# Patient Record
Sex: FEMALE | Race: WHITE | Employment: FULL TIME | URBAN - METROPOLITAN AREA
[De-identification: names, ages, dates, MRNs, and addresses within clinical notes are randomized per-mention and may not be internally consistent; named-entity substitution may affect disease eponyms.]

---

## 2017-11-11 ENCOUNTER — APPOINTMENT (OUTPATIENT)
Dept: ULTRASOUND IMAGING | Age: 37
End: 2017-11-11
Attending: EMERGENCY MEDICINE
Payer: SUBSIDIZED

## 2017-11-11 ENCOUNTER — HOSPITAL ENCOUNTER (EMERGENCY)
Age: 37
Discharge: HOME | End: 2017-11-11
Attending: EMERGENCY MEDICINE
Payer: SUBSIDIZED

## 2017-11-11 VITALS
SYSTOLIC BLOOD PRESSURE: 148 MMHG | WEIGHT: 167 LBS | OXYGEN SATURATION: 100 % | HEART RATE: 84 BPM | DIASTOLIC BLOOD PRESSURE: 80 MMHG | HEIGHT: 64 IN | TEMPERATURE: 98.5 F | BODY MASS INDEX: 28.51 KG/M2 | RESPIRATION RATE: 18 BRPM

## 2017-11-11 DIAGNOSIS — K80.20 CALCULUS OF GALLBLADDER WITHOUT CHOLECYSTITIS WITHOUT OBSTRUCTION: ICD-10-CM

## 2017-11-11 DIAGNOSIS — R10.9 RIGHT FLANK PAIN: Primary | ICD-10-CM

## 2017-11-11 LAB
APPEARANCE UR: ABNORMAL
BACTERIA URNS QL MICRO: NEGATIVE /HPF
BILIRUB UR QL: NEGATIVE
COLOR UR: ABNORMAL
EPITH CASTS URNS QL MICRO: ABNORMAL /LPF
GLUCOSE UR STRIP.AUTO-MCNC: NEGATIVE MG/DL
HCG UR QL: NEGATIVE
HGB UR QL STRIP: ABNORMAL
KETONES UR QL STRIP.AUTO: NEGATIVE MG/DL
LEUKOCYTE ESTERASE UR QL STRIP.AUTO: NEGATIVE
NITRITE UR QL STRIP.AUTO: NEGATIVE
PH UR STRIP: 6 [PH] (ref 5–8)
PROT UR STRIP-MCNC: ABNORMAL MG/DL
RBC #/AREA URNS HPF: ABNORMAL /HPF (ref 0–5)
SP GR UR REFRACTOMETRY: 1.02 (ref 1–1.03)
UROBILINOGEN UR QL STRIP.AUTO: 1 EU/DL (ref 0.2–1)
WBC URNS QL MICRO: ABNORMAL /HPF (ref 0–4)
YEAST BUDDING URNS QL: PRESENT

## 2017-11-11 PROCEDURE — 81025 URINE PREGNANCY TEST: CPT

## 2017-11-11 PROCEDURE — 76770 US EXAM ABDO BACK WALL COMP: CPT

## 2017-11-11 PROCEDURE — 99283 EMERGENCY DEPT VISIT LOW MDM: CPT

## 2017-11-11 PROCEDURE — 81001 URINALYSIS AUTO W/SCOPE: CPT | Performed by: EMERGENCY MEDICINE

## 2017-11-11 PROCEDURE — 74011250637 HC RX REV CODE- 250/637: Performed by: EMERGENCY MEDICINE

## 2017-11-11 RX ORDER — NAPROXEN 250 MG/1
500 TABLET ORAL
Status: COMPLETED | OUTPATIENT
Start: 2017-11-11 | End: 2017-11-11

## 2017-11-11 RX ADMIN — NAPROXEN 500 MG: 250 TABLET ORAL at 19:27

## 2017-11-11 NOTE — ED TRIAGE NOTES
Patient has right flank pain, nausea, lightheadedness, and abdominal swelling. States for two weeks now. Also has been spotting for 18 days.

## 2017-11-11 NOTE — ED PROVIDER NOTES
HPI Comments: 44 y.o. female with past medical history significant for meningitis, high cholesterol, sleep apnea, and herpes who presents from home with chief complaint of flank pain. Pt reports her R flank has been swollen with 9/10 pain for 2 weeks accompanied by nausea, \"spotting\", and difficulty starting a stream when urinating. She also c/o R ankle swelling for several days. Pt states she has previously had similar sx, and was dx with a \"kidney infection\" at that time. She has been taking naproxen with no relief. In addition,  Pt notes she has been drinking lots of water and cranberry juice. She takes vitamins daily. Pt denies fever. There are no other acute medical concerns at this time. Old Chart Review: Pt was last here September 29, 2015 for gastroenteritis. She had a CT of abdomen/pelvis which was negative on September 22, 2013. No kidney stones were mentioned. Social hx: smokes cigarettes    Note written by Koko Verma, as dictated by Aly Manrique DO 7:00 PM    The history is provided by the patient. Past Medical History:   Diagnosis Date    High cholesterol     Infectious disease     Herpes    Meningitis     Sleep apnea        Past Surgical History:   Procedure Laterality Date    HX GYN      2 D&C procedures at age 22    HX ORTHOPAEDIC      right leg tissue removal         No family history on file. Social History     Social History    Marital status: SINGLE     Spouse name: N/A    Number of children: N/A    Years of education: N/A     Occupational History    Not on file. Social History Main Topics    Smoking status: Former Smoker    Smokeless tobacco: Never Used    Alcohol use No    Drug use: No    Sexual activity: Not on file     Other Topics Concern    Not on file     Social History Narrative         ALLERGIES: Aspirin and Pcn [penicillins]    Review of Systems   Constitutional: Negative for fever. Cardiovascular: Positive for leg swelling. Gastrointestinal: Positive for nausea. Genitourinary: Positive for difficulty urinating, flank pain and vaginal bleeding. All other systems reviewed and are negative. Vitals:    11/11/17 1840   BP: 137/68   Pulse: 81   Resp: 20   Temp: 98.5 °F (36.9 °C)   SpO2: 100%   Weight: 75.8 kg (167 lb)   Height: 5' 4\" (1.626 m)            Physical Exam      Constitutional: Pt is awake and alert. Pt appears well-developed and well-nourished. NAD. HENT:   Head: Normocephalic and atraumatic. Nose: Nose normal.   Mouth/Throat: Oropharynx is clear and moist. No oropharyngeal exudate. Eyes: Conjunctivae and extraocular motions are normal. Pupils are equal, round, and reactive to light. Right eye exhibits no discharge. Left eye exhibits no discharge. No scleral icterus. Neck: No tracheal deviation present. Supple neck. Cardiovascular: Normal rate, regular rhythm, normal heart sounds and intact distal pulses. Exam reveals no gallop and no friction rub. No murmur heard. Pulmonary/Chest: Effort normal and breath sounds normal.  Pt  has no wheezes. Pt  has no rales. Abdominal: Soft. Pt  exhibits no distension and no mass. No tenderness. Pt  has no rebound and no guarding. Musculoskeletal:  Pt  exhibits and no tenderness. No CVA tenderness. Can't detect any swelling of R flank. Ext: Normal ROM in all four extremities; not tender to palpation; distal pulses are normal. R ankle minimally larger than L ankle. No redness. Neurological:  Pt is alert. nonfocal neuro exam.  Skin: Skin is warm and dry. Pt  is not diaphoretic. No rash. Psychiatric:  Pt  has a normal mood and affect.  Behavior is normal.   Note written by Koko Bruno, as dictated by Chucky Rueda DO 7:00 PM    [facility]  ED Course       Procedures           Labs Reviewed   URINALYSIS W/ RFLX MICROSCOPIC - Abnormal; Notable for the following:        Result Value    Appearance CLOUDY (*)     Protein TRACE (*)     Blood LARGE (*)     All other components within normal limits   HCG URINE, QL. - POC   POC URINE PREGNANCY TEST       Pt has been spotting - this could be source of the above results - pt would like imaging. Will US. Last CT 4 yrs ago had no stones mentioned. otc pain meds  Doubt this is biliary colic. No abd pain and not related to food.

## 2017-11-12 NOTE — DISCHARGE INSTRUCTIONS
Biliary Colic: Care Instructions  Your Care Instructions    Biliary (say \"BILL-ee-air-ee\") colic is belly pain caused by gallbladder problems. It is usually caused by a gallstone moving through or blocking the common bile duct or cystic duct. Gallstones are stones that form in the gallbladder. They are made of cholesterol and other substances. The gallbladder is a small sac located just under the liver. It stores bile released by the liver. Bile helps you digest fats. Gallstones also can form in the common bile duct or cystic duct. These ducts carry bile from the gallbladder and the liver to the small intestine. Gallstones may be as small as a grain of sand or as large as a golf ball. Gallstones that cause severe symptoms usually are treated with surgery to remove the gallbladder. If the first attack of biliary colic is mild, it is often safe to wait until you have had another attack before you think about having surgery. The doctor has checked you carefully, but problems can develop later. If you notice any problems or new symptoms, get medical treatment right away. Follow-up care is a key part of your treatment and safety. Be sure to make and go to all appointments, and call your doctor if you are having problems. It's also a good idea to know your test results and keep a list of the medicines you take. How can you care for yourself at home? · Take pain medicines exactly as directed. ¨ If the doctor gave you a prescription medicine for pain, take it as prescribed. ¨ If you are not taking a prescription pain medicine, ask your doctor if you can take an over-the-counter medicine. Read and follow all instructions on the label. · Avoid foods that cause symptoms, especially fatty foods. These can cause biliary colic. · You may need more tests to look at your gallbladder. When should you call for help? Call your doctor now or seek immediate medical care if:  ? · You have a fever.    ? · You have new belly pain, or your pain gets worse. ? · There is a new or increasing yellow tint to your skin or the whites of your eyes. ? · Your urine is dark yellow-brown, or your stools are light-colored or white. ? · You cannot keep down fluids. ? Watch closely for changes in your health, and be sure to contact your doctor if:  ? · You do not get better as expected. ? · You are not getting better after 1 day (24 hours). Where can you learn more? Go to http://andreea-fernando.info/. Enter W100 in the search box to learn more about \"Biliary Colic: Care Instructions. \"  Current as of: May 12, 2017  Content Version: 11.4  © 7708-5467 [employer]. Care instructions adapted under license by [employer]jing (which disclaims liability or warranty for this information). If you have questions about a medical condition or this instruction, always ask your healthcare professional. [place] [address] any warranty or liability for your use of this information. Low Back Pain: Exercises  Your Care Instructions  Here are some examples of typical rehabilitation exercises for your condition. Start each exercise slowly. Ease off the exercise if you start to have pain. Your doctor or physical therapist will tell you when you can start these exercises and which ones will work best for you. How to do the exercises  Press-up    1. Lie on your stomach, supporting your body with your forearms. 2. Press your elbows down into the floor to raise your upper back. As you do this, relax your stomach muscles and allow your back to arch without using your back muscles. As your press up, do not let your hips or pelvis come off the floor. 3. Hold for 15 to 30 seconds, then relax. 4. Repeat 2 to 4 times. Alternate arm and leg (bird dog) exercise    Do this exercise slowly. Try to keep your body straight at all times, and do not let one hip drop lower than the other.   1. Start on the floor, on your hands and knees. 2. Tighten your belly muscles. 3. Raise one leg off the floor, and hold it straight out behind you. Be careful not to let your hip drop down, because that will twist your trunk. 4. Hold for about 6 seconds, then lower your leg and switch to the other leg. 5. Repeat 8 to 12 times on each leg. 6. Over time, work up to holding for 10 to 30 seconds each time. 7. If you feel stable and secure with your leg raised, try raising the opposite arm straight out in front of you at the same time. Knee-to-chest exercise    1. Lie on your back with your knees bent and your feet flat on the floor. 2. Bring one knee to your chest, keeping the other foot flat on the floor (or keeping the other leg straight, whichever feels better on your lower back). 3. Keep your lower back pressed to the floor. Hold for at least 15 to 30 seconds. 4. Relax, and lower the knee to the starting position. 5. Repeat with the other leg. Repeat 2 to 4 times with each leg. 6. To get more stretch, put your other leg flat on the floor while pulling your knee to your chest.  Curl-ups    1. Lie on the floor on your back with your knees bent at a 90-degree angle. Your feet should be flat on the floor, about 12 inches from your buttocks. 2. Cross your arms over your chest. If this bothers your neck, try putting your hands behind your neck (not your head), with your elbows spread apart. 3. Slowly tighten your belly muscles and raise your shoulder blades off the floor. 4. Keep your head in line with your body, and do not press your chin to your chest.  5. Hold this position for 1 or 2 seconds, then slowly lower yourself back down to the floor. 6. Repeat 8 to 12 times. Pelvic tilt exercise    1. Lie on your back with your knees bent. 2. \"Brace\" your stomach. This means to tighten your muscles by pulling in and imagining your belly button moving toward your spine.  You should feel like your back is pressing to the floor and your hips and pelvis are rocking back. 3. Hold for about 6 seconds while you breathe smoothly. 4. Repeat 8 to 12 times. Heel dig bridging    1. Lie on your back with both knees bent and your ankles bent so that only your heels are digging into the floor. Your knees should be bent about 90 degrees. 2. Then push your heels into the floor, squeeze your buttocks, and lift your hips off the floor until your shoulders, hips, and knees are all in a straight line. 3. Hold for about 6 seconds as you continue to breathe normally, and then slowly lower your hips back down to the floor and rest for up to 10 seconds. 4. Do 8 to 12 repetitions. Hamstring stretch in doorway    1. Lie on your back in a doorway, with one leg through the open door. 2. Slide your leg up the wall to straighten your knee. You should feel a gentle stretch down the back of your leg. 3. Hold the stretch for at least 15 to 30 seconds. Do not arch your back, point your toes, or bend either knee. Keep one heel touching the floor and the other heel touching the wall. 4. Repeat with your other leg. 5. Do 2 to 4 times for each leg. Hip flexor stretch    1. Kneel on the floor with one knee bent and one leg behind you. Place your forward knee over your foot. Keep your other knee touching the floor. 2. Slowly push your hips forward until you feel a stretch in the upper thigh of your rear leg. 3. Hold the stretch for at least 15 to 30 seconds. Repeat with your other leg. 4. Do 2 to 4 times on each side. Wall sit    1. Stand with your back 10 to 12 inches away from a wall. 2. Lean into the wall until your back is flat against it. 3. Slowly slide down until your knees are slightly bent, pressing your lower back into the wall. 4. Hold for about 6 seconds, then slide back up the wall. 5. Repeat 8 to 12 times. Follow-up care is a key part of your treatment and safety.  Be sure to make and go to all appointments, and call your doctor if you are having problems. It's also a good idea to know your test results and keep a list of the medicines you take. Where can you learn more? Go to http://andreea-fernando.info/. Enter O330 in the search box to learn more about \"Low Back Pain: Exercises. \"  Current as of: March 21, 2017  Content Version: 11.4  © 5547-0874 [employer]unch[employer]. Care instructions adapted under license by [employer] (which disclaims liability or warranty for this information). If you have questions about a medical condition or this instruction, always ask your healthcare professional. [place] [address] any warranty or liability for your use of this information. Back Pain: Care Instructions  Your Care Instructions    Back pain has many possible causes. It is often related to problems with muscles and ligaments of the back. It may also be related to problems with the nerves, discs, or bones of the back. Moving, lifting, standing, sitting, or sleeping in an awkward way can strain the back. Sometimes you don't notice the injury until later. Arthritis is another common cause of back pain. Although it may hurt a lot, back pain usually improves on its own within several weeks. Most people recover in 12 weeks or less. Using good home treatment and being careful not to stress your back can help you feel better sooner. Follow-up care is a key part of your treatment and safety. Be sure to make and go to all appointments, and call your doctor if you are having problems. It's also a good idea to know your test results and keep a list of the medicines you take. How can you care for yourself at home? · Sit or lie in positions that are most comfortable and reduce your pain. Try one of these positions when you lie down:  ¨ Lie on your back with your knees bent and supported by large pillows. ¨ Lie on the floor with your legs on the seat of a sofa or chair.   Stuart Maidens on your side with your knees and hips bent and a pillow between your legs. ¨ Lie on your stomach if it does not make pain worse. · Do not sit up in bed, and avoid soft couches and twisted positions. Bed rest can help relieve pain at first, but it delays healing. Avoid bed rest after the first day of back pain. · Change positions every 30 minutes. If you must sit for long periods of time, take breaks from sitting. Get up and walk around, or lie in a comfortable position. · Try using a heating pad on a low or medium setting for 15 to 20 minutes every 2 or 3 hours. Try a warm shower in place of one session with the heating pad. · You can also try an ice pack for 10 to 15 minutes every 2 to 3 hours. Put a thin cloth between the ice pack and your skin. · Take pain medicines exactly as directed. ¨ If the doctor gave you a prescription medicine for pain, take it as prescribed. ¨ If you are not taking a prescription pain medicine, ask your doctor if you can take an over-the-counter medicine. · Take short walks several times a day. You can start with 5 to 10 minutes, 3 or 4 times a day, and work up to longer walks. Walk on level surfaces and avoid hills and stairs until your back is better. · Return to work and other activities as soon as you can. Continued rest without activity is usually not good for your back. · To prevent future back pain, do exercises to stretch and strengthen your back and stomach. Learn how to use good posture, safe lifting techniques, and proper body mechanics. When should you call for help? Call your doctor now or seek immediate medical care if:  ? · You have new or worsening numbness in your legs. ? · You have new or worsening weakness in your legs. (This could make it hard to stand up.)   ? · You lose control of your bladder or bowels. ? Watch closely for changes in your health, and be sure to contact your doctor if:  ? · Your pain gets worse. ? · You are not getting better after 2 weeks.    Where can you learn more?  Go to http://andreea-fernando.info/. Enter J145 in the search box to learn more about \"Back Pain: Care Instructions. \"  Current as of: March 21, 2017  Content Version: 11.4  © 8167-2164 Healthwise[employer]. Care instructions adapted under license by Vivisimo (which disclaims liability or warranty for this information). If you have questions about a medical condition or this instruction, always ask your healthcare professional. [address] any warranty or liability for your use of this information. [facility] Departments     For adult and child immunizations, family planning, TB screening, STD testing and women's health services. [facility]: [place] [phone]      [facility] 25   657 [facility]   [address]   170 [facility]: [address] [phone]      [facility]          [address] [place] [address]     For primary care services, woman and child wellness, and some clinics providing specialty care. [facility] -- 1011 [facility]. [address] [place] [address] [phone]   [address] [facility] 200 [facility] 3614 [facility] [phone]   339 [facility] [address] [place] [address] [phone]   [address] [address] [place] [address] [address] [place] [address]  [phone]   [address] [place] [phone]   [facility] 81 [facility] [phone]   Stefan Marquez [facility] 1051 [facility] [phone]   [facility]: [facility] 700 [address], ext [address] [facility], [address] [phone]     [address] [phone]   [facility] [place] 5850 [facility]  [phone]   [employer]  [address], [address] ([email or web address]/about/[email or web address]) [phone]         Sexual Health/Woman Wellness Clinics    For STD/HIV testing and treatment, pregnancy testing and services, men's health, birth control services, LGBT services, and hepatitis/HPV vaccine services. [employer] for [place] [employer] 201 [address]. [facility] 75 [facility] [address] [facility] 1579 600 [address]AMEL[address] Parker [phone]   [facility] [address], 5th floor [phone]   Pregnancy [address] [facility] [address]  [place] [phone] 6847 [address] [facility] 94 [facility]   [phone]   [place]   [phone]   Women, Infant and Children's Services: [address] [phone]       600 [facility]   [phone]   [facility]   [phone]   [facility]     [phone]   [address] Crisis   1212 [facility] [phone]     Local Primary [facility] Physicians  64 [facility] [phone]  Kristopher Kite, MD Merlin Ehlers, MD Greta Maroon, MD [facility] [phone]  Jodi Rios, MD Jorge Veliz MD Fredrica Staple, MD [address] [place] [address] [phone]  Ranell Antonio[place] [address] [facility] [phone]  Mayco Montoya, MD Jordan Sommers MD Edmundo Porteous, MD   [facility] [phone]  MD Sneha CHAMORRO MD Malena Sellers, NP [address] [facility] [address] [phone]  [facility], MD Kela Oppenheim, MD Cinthia Cruise, MD Lenita Rough, MD Marvene Pin, MD Jerald Pepper, MD Randon Porteous, MD   [place] [address] Chencho Thornton MD [facility] [phone]  MD Floresita Zarco Jacqueline Gee, HERSON Renteria, MD Daisy Judge, MD Kel Madrid, MD Sara Carpenter, MD Isi Restrepo, MD   7973 [facility] [phone]  Patricia Webb, MD Eva Long, FNP  Ana Wong, NP  Eric Youngblood, MD Trudi Chandler, MD Rob Scanlon, MD Jacki Figueroa, MD [facility] [phone]  Jordan Warner, MD Quinton Koyanagi, MD Queen Chaparro, MD Carmen Leija, MD Corby Juares, MD   [facility] [phone]  Lizette Huston, MD Gonsalo Bruce, MD [place] [phone]  MD Kevin Brenner, MD Luetta Aase, MD   [facility] [phone]  Damien Blair, MD Mali Carter, MD Kole Ramos, MD Alexis Lancaster, MD Alicia Zuniga, MD Mykel Harris, HERSON [address] MD 1619 [facility]   [phone]  Paula Mackey, MD Mercedes Strickland, MD Bernice Burroughs MD   2102 [facility] [phone]  [facility] 150, MD Lexi Jackson, [facility]  Radha Fortune, PA-C  Radha Fortune, FNP  Margi Mackey, PA-C  Catarino Gutierrez, MD Anne-Marie Mcwilliams, HERSON Ernst,  Miscellaneous:  Homero Lowry [phone]

## 2018-04-11 ENCOUNTER — APPOINTMENT (OUTPATIENT)
Dept: ULTRASOUND IMAGING | Age: 38
End: 2018-04-11
Attending: EMERGENCY MEDICINE
Payer: SUBSIDIZED

## 2018-04-11 ENCOUNTER — HOSPITAL ENCOUNTER (EMERGENCY)
Age: 38
Discharge: HOME | End: 2018-04-11
Attending: EMERGENCY MEDICINE | Admitting: EMERGENCY MEDICINE
Payer: SUBSIDIZED

## 2018-04-11 VITALS
OXYGEN SATURATION: 97 % | RESPIRATION RATE: 18 BRPM | WEIGHT: 185 LBS | TEMPERATURE: 98.2 F | BODY MASS INDEX: 31.58 KG/M2 | HEIGHT: 64 IN | DIASTOLIC BLOOD PRESSURE: 69 MMHG | SYSTOLIC BLOOD PRESSURE: 122 MMHG | HEART RATE: 84 BPM

## 2018-04-11 DIAGNOSIS — O23.41 UTI IN PREGNANCY, ANTEPARTUM, FIRST TRIMESTER: ICD-10-CM

## 2018-04-11 DIAGNOSIS — R10.32 ABDOMINAL PAIN, LLQ (LEFT LOWER QUADRANT): Primary | ICD-10-CM

## 2018-04-11 LAB
AMORPH CRY URNS QL MICRO: ABNORMAL
APPEARANCE UR: ABNORMAL
BACTERIA URNS QL MICRO: ABNORMAL /HPF
BILIRUB UR QL: NEGATIVE
CAOX CRY URNS QL MICRO: ABNORMAL
COLOR UR: ABNORMAL
EPITH CASTS URNS QL MICRO: ABNORMAL /LPF
GLUCOSE UR STRIP.AUTO-MCNC: NEGATIVE MG/DL
HCG UR QL: POSITIVE
HGB UR QL STRIP: ABNORMAL
KETONES UR QL STRIP.AUTO: NEGATIVE MG/DL
LEUKOCYTE ESTERASE UR QL STRIP.AUTO: ABNORMAL
NITRITE UR QL STRIP.AUTO: NEGATIVE
PH UR STRIP: 6 [PH] (ref 5–8)
PROT UR STRIP-MCNC: NEGATIVE MG/DL
RBC #/AREA URNS HPF: ABNORMAL /HPF (ref 0–5)
SP GR UR REFRACTOMETRY: 1.02 (ref 1–1.03)
UROBILINOGEN UR QL STRIP.AUTO: 0.2 EU/DL (ref 0.2–1)
WBC URNS QL MICRO: ABNORMAL /HPF (ref 0–4)

## 2018-04-11 PROCEDURE — 74011250637 HC RX REV CODE- 250/637: Performed by: EMERGENCY MEDICINE

## 2018-04-11 PROCEDURE — 81025 URINE PREGNANCY TEST: CPT

## 2018-04-11 PROCEDURE — 81001 URINALYSIS AUTO W/SCOPE: CPT | Performed by: EMERGENCY MEDICINE

## 2018-04-11 PROCEDURE — 87086 URINE CULTURE/COLONY COUNT: CPT | Performed by: EMERGENCY MEDICINE

## 2018-04-11 PROCEDURE — 76801 OB US < 14 WKS SINGLE FETUS: CPT

## 2018-04-11 PROCEDURE — 76817 TRANSVAGINAL US OBSTETRIC: CPT

## 2018-04-11 PROCEDURE — 99284 EMERGENCY DEPT VISIT MOD MDM: CPT

## 2018-04-11 RX ORDER — CEPHALEXIN 500 MG/1
500 CAPSULE ORAL 3 TIMES DAILY
Qty: 15 CAP | Refills: 0 | Status: SHIPPED | OUTPATIENT
Start: 2018-04-12 | End: 2018-04-17

## 2018-04-11 RX ORDER — CEPHALEXIN 250 MG/1
500 CAPSULE ORAL
Status: COMPLETED | OUTPATIENT
Start: 2018-04-11 | End: 2018-04-11

## 2018-04-11 RX ADMIN — CEPHALEXIN 500 MG: 250 CAPSULE ORAL at 22:32

## 2018-04-12 NOTE — ED TRIAGE NOTES
Patient was tested a week ago and was + for pregnancy, and has been having left lower abd pain. Has been feeling \"sick\". Thinks is about 5-6 weeks pregnant. Denies having any bleeding or discharge.

## 2018-04-12 NOTE — DISCHARGE INSTRUCTIONS
Belly Pain in Pregnancy: Care Instructions  Your Care Instructions    When you're pregnant, any belly pain can be a worry. You may not want to call your doctor about every pain you have. But you don't want to miss something that is dangerous for you or your baby. Even if it feels familiar, belly pain can mean something new when you're pregnant. It's important to know when to call your doctor. It will also help to know how to care for yourself at home when your pain is not caused by anything harmful. · When belly pain is more severe or constant, see a doctor right away. · If you're sure your belly pain is a sign of labor, call your doctor. · When belly pain is brief, it's usually a normal part of pregnancy. It might be related to changes in the growing uterus. Or it could be the stretching of ligaments called round ligaments. These ligaments help support the uterus. Round ligament pain can be on either side of your belly. It can also be felt in your hips or groin. Follow-up care is a key part of your treatment and safety. Be sure to make and go to all appointments, and call your doctor if you are having problems. It's also a good idea to know your test results and keep a list of the medicines you take. How can you tell if belly pain is a sign of labor? When belly pain is caused by labor, it can feel like mild or menstrual-like cramps in your lower belly. These cramps are probably contractions. They can happen in your second or third trimester. You may also have:  · A steady, dull ache in your lower back, pelvis, or thighs. · A feeling of pressure in your pelvis or lower belly. · Changes in your vaginal discharge or a sudden release of fluid from the vagina. If you think you are in labor, call your doctor. How can you care for yourself at home? When belly pain is mild and is not a symptom of labor:  · Rest until you feel better. · Take a warm bath.   · Think about what you drink and eat:  ¨ Drink plenty of fluids. Choose water and other caffeine-free clear liquids until you feel better. ¨ Try eating small, frequent meals. If your stomach is upset, try bland, low-fat foods like plain rice, broiled chicken, toast, and yogurt. · Think about how you move if you are having brief pains from stretching of the round ligaments. ¨ Try gentle stretching. ¨ Move a little more slowly when turning in bed or getting up from a chair, so those ligaments don't stretch quickly. ¨ Lean forward a bit if you think you are going to cough or sneeze. When should you call for help? Call 911 anytime you think you may need emergency care. For example, call if:  ? · You have sudden, severe pain in your belly. ? · You have severe vaginal bleeding. ?Call your doctor now or seek immediate medical care if:  ? · You have new or worse belly pain or cramping. ? · You have any vaginal bleeding. ? · You have a fever. ? · You have symptoms of preeclampsia, such as:  ¨ Sudden swelling of your face, hands, or feet. ¨ New vision problems (such as dimness or blurring). ¨ A severe headache. ? · You think that you may be in labor. This means that you've had at least 8 contractions within 1 hour or at least 4 contractions within 20 minutes, even after you change your position and drink fluids. ? · You have symptoms of a urinary tract infection. These may include:  ¨ Pain or burning when you urinate. ¨ A frequent need to urinate without being able to pass much urine. ¨ Pain in the flank, which is just below the rib cage and above the waist on either side of the back. ¨ Blood in your urine. ? Watch closely for changes in your health, and be sure to contact your doctor if you are worried about your or your baby's health. Where can you learn more? Go to http://andreea-fernando.info/. Enter [id number] [id number] in the search box to learn more about \"Belly Pain in Pregnancy: Care Instructions. \"  Current as of: March 16, 2017  Content Version: 11.4  © 7096-4751 [employer]. Care instructions adapted under license by [employer] (which disclaims liability or warranty for this information). If you have questions about a medical condition or this instruction, always ask your healthcare professional. [address] any warranty or liability for your use of this information.

## 2018-04-12 NOTE — ED PROVIDER NOTES
Patient is a 44 y.o. female presenting with pregnancy problem and abdominal pain. The history is provided by the patient. Pregnancy Problem    This is a new problem. The current episode started more than 2 days ago. The problem has been gradually worsening. The pain is located in the LLQ. The pain is at a severity of 6/10. Associated symptoms include dysuria and frequency. Pertinent negatives include no diarrhea, no nausea and no vomiting. The patient's surgical history non-contributory. Abdominal Pain    This is a new problem. The problem has been gradually worsening. The pain is at a severity of 6/10. Associated symptoms include dysuria and frequency. Pertinent negatives include no diarrhea, no nausea and no vomiting. The patient's surgical history non-contributory. Past Medical History:   Diagnosis Date    High cholesterol     Infectious disease     Herpes    Meningitis     Sleep apnea        Past Surgical History:   Procedure Laterality Date    HX GYN      2 D&C procedures at age 22    HX ORTHOPAEDIC      right leg tissue removal         No family history on file. Social History     Social History    Marital status: SINGLE     Spouse name: N/A    Number of children: N/A    Years of education: N/A     Occupational History    Not on file. Social History Main Topics    Smoking status: Former Smoker    Smokeless tobacco: Never Used    Alcohol use No    Drug use: No    Sexual activity: Not on file     Other Topics Concern    Not on file     Social History Narrative         ALLERGIES: Aspirin and Pcn [penicillins]    Review of Systems   Gastrointestinal: Positive for abdominal pain. Negative for diarrhea, nausea and vomiting. Genitourinary: Positive for dysuria, frequency and pelvic pain. Negative for vaginal bleeding and vaginal discharge.        Vitals:    04/11/18 2100 04/11/18 2230   BP: 150/78 122/69   Pulse: 84    Resp: 18    Temp: 98.2 °F (36.8 °C)    SpO2: 99% 97%   Weight: 83.9 kg (185 lb)    Height: 5' 4\" (1.626 m)             Physical Exam   Constitutional: She appears well-developed and well-nourished. HENT:   Head: Normocephalic and atraumatic. Pulmonary/Chest: Effort normal. No respiratory distress. Abdominal: Soft. There is no rebound and no guarding. Minimal LLQ tenderness   Genitourinary:   Genitourinary Comments: deferred   Nursing note and vitals reviewed. MDM  Number of Diagnoses or Management Options  Abdominal pain, LLQ (left lower quadrant):   UTI in pregnancy, antepartum, first trimester:   Diagnosis management comments: Patient with lower abdominal tenderness - concern for ectopic pregnancy vs UTI  Check ua and US to confirm IUP       Amount and/or Complexity of Data Reviewed  Clinical lab tests: reviewed and ordered  Tests in the radiology section of CPT®: reviewed and ordered          ED Course       Procedures           VITALS:   No data found. Recent Results (from the past 24 hour(s))   HCG URINE, QL. - POC    Collection Time: 18  9:36 PM   Result Value Ref Range    Pregnancy test,urine (POC) POSITIVE (A) NEG     URINALYSIS W/ RFLX MICROSCOPIC    Collection Time: 18  9:38 PM   Result Value Ref Range    Color YELLOW/STRAW      Appearance CLOUDY (A) CLEAR      Specific gravity 1.023 1.003 - 1.030      pH (UA) 6.0 5.0 - 8.0      Protein NEGATIVE  NEG mg/dL    Glucose NEGATIVE  NEG mg/dL    Ketone NEGATIVE  NEG mg/dL    Bilirubin NEGATIVE  NEG      Blood MODERATE (A) NEG      Urobilinogen 0.2 0.2 - 1.0 EU/dL    Nitrites NEGATIVE  NEG      Leukocyte Esterase MODERATE (A) NEG      WBC 20-50 0 - 4 /hpf    RBC 0-5 0 - 5 /hpf    Epithelial cells MODERATE (A) FEW /lpf    Bacteria 1+ (A) NEG /hpf    Amorphous Crystals 1+ (A) NEG    CA Oxalate crystals 1+ (A) NEG       Us Uts Transvaginal Ob    Result Date: 2018  INDICATION:  Left pelvic pain. Positive pregnancy test 1 week ago. LMP on 3/2/2018. .  COMPARISON:  None. TECHNIQUE: Transabdominal and endovaginal ultrasound of the female pelvis. (2 separate studies reported together) Transvaginal scanning was performed for better evaluation of the endometrial contents. FINDINGS: Transabdominal ultrasound:  Urinary bladder: within normal limits. Uterus: measures 9.4 x 5.4 x 6.3 cm, which is within normal limits. There is no sonographic myometrial abnormality. Endometrium: Contains a single intrauterine pregnancy. Mean sac volume of the gestational sac measures 1.6 cm. No consecutive visible. Fetal pole is not measurable. Amniotic fluid volume is within normal limits. Placenta is not yet developed. Right ovary: 2.7 x 1.7 x 1.7 cm. Blood flow is present in the right ovary. No right adnexal mass or cyst.  Left ovary: 3.6 x 2.6 x 1.9 cm. Blood flow is present in the left ovary. No adnexal cyst or mass. Free fluid: None. Endovaginal ultrasound:   Urinary bladder: Nondistended. Uterus: measures 9.4 x 5.3 x 6.3 cm, which is within normal limits. There is no sonographic myometrial abnormality. Endometrium: Contains a single intrauterine pregnancy. Crown-rump length of the fetal pole measures 0.2 cm. Gestational sac mean sac diameter measures 1.6 cm. Estimated gestational age is 5 weeks and 6 days +/- 3 days. Yolk sac is present. Cardiac flicker is visible but not measurable. Amniotic fluid volume is within normal limits. Placenta is not yet developed. Cervix is closed. Right ovary: 2.4 x 1.8 x 1.9 cm. Blood flow is present in the right ovary. No right adnexal mass or cyst.  Left ovary: 2.9 x 1.7 x 2.6 cm. Blood flow is present in the left ovary. No adnexal cyst or mass. Free fluid: None. IMPRESSION:  Single viable intrauterine pregnancy with estimated age of 10 weeks and 6 days. Estimated date of delivery is 12/6/2018. No sonographic evidence of complication. Us Preg Uts < 14 Wks Sngl    Result Date: 4/11/2018  INDICATION:  Left pelvic pain.  Positive pregnancy test 1 week ago. LMP on 3/2/2018. . COMPARISON:  None. TECHNIQUE: Transabdominal and endovaginal ultrasound of the female pelvis. (2 separate studies reported together) Transvaginal scanning was performed for better evaluation of the endometrial contents. FINDINGS: Transabdominal ultrasound: Urinary bladder: within normal limits. Uterus: measures 9.4 x 5.4 x 6.3 cm, which is within normal limits. There is no sonographic myometrial abnormality. Endometrium: Contains a single intrauterine pregnancy. Mean sac volume of the gestational sac measures 1.6 cm. No consecutive visible. Fetal pole is not measurable. Amniotic fluid volume is within normal limits. Placenta is not yet developed. Right ovary: 2.7 x 1.7 x 1.7 cm. Blood flow is present in the right ovary. No right adnexal mass or cyst. Left ovary: 3.6 x 2.6 x 1.9 cm. Blood flow is present in the left ovary. No adnexal cyst or mass. Free fluid: None. Endovaginal ultrasound: Urinary bladder: Nondistended. Uterus: measures 9.4 x 5.3 x 6.3 cm, which is within normal limits. There is no sonographic myometrial abnormality. Endometrium: Contains a single intrauterine pregnancy. Crown-rump length of the fetal pole measures 0.2 cm. Gestational sac mean sac diameter measures 1.6 cm. Estimated gestational age is 5 weeks and 6 days +/- 3 days. Yolk sac is present. Cardiac flicker is visible but not measurable. Amniotic fluid volume is within normal limits. Placenta is not yet developed. Cervix is closed. Right ovary: 2.4 x 1.8 x 1.9 cm. Blood flow is present in the right ovary. No right adnexal mass or cyst. Left ovary: 2.9 x 1.7 x 2.6 cm. Blood flow is present in the left ovary. No adnexal cyst or mass. Free fluid: None. IMPRESSION: Single viable intrauterine pregnancy with estimated age of 10 weeks and 6 days. Estimated date of delivery is 2018. No sonographic evidence of complication.

## 2018-04-13 LAB
BACTERIA SPEC CULT: NORMAL
CC UR VC: NORMAL
SERVICE CMNT-IMP: NORMAL

## 2018-05-09 ENCOUNTER — HOSPITAL ENCOUNTER (EMERGENCY)
Age: 38
Discharge: HOME | End: 2018-05-09
Attending: EMERGENCY MEDICINE
Payer: SUBSIDIZED

## 2018-05-09 VITALS
SYSTOLIC BLOOD PRESSURE: 136 MMHG | TEMPERATURE: 98.4 F | BODY MASS INDEX: 31.58 KG/M2 | DIASTOLIC BLOOD PRESSURE: 72 MMHG | HEIGHT: 64 IN | HEART RATE: 84 BPM | WEIGHT: 184.97 LBS | OXYGEN SATURATION: 100 % | RESPIRATION RATE: 16 BRPM

## 2018-05-09 DIAGNOSIS — N73.9 PID (PELVIC INFLAMMATORY DISEASE): Primary | ICD-10-CM

## 2018-05-09 LAB
ALBUMIN SERPL-MCNC: 4.1 G/DL (ref 3.5–5)
ALBUMIN/GLOB SERPL: 1.2 {RATIO} (ref 1.1–2.2)
ALP SERPL-CCNC: 43 U/L (ref 45–117)
ALT SERPL-CCNC: 15 U/L (ref 12–78)
ANION GAP SERPL CALC-SCNC: 10 MMOL/L (ref 5–15)
APPEARANCE UR: CLEAR
AST SERPL-CCNC: 11 U/L (ref 15–37)
BACTERIA URNS QL MICRO: NEGATIVE /HPF
BASOPHILS # BLD: 0 K/UL (ref 0–0.1)
BASOPHILS NFR BLD: 0 % (ref 0–1)
BILIRUB SERPL-MCNC: 0.3 MG/DL (ref 0.2–1)
BILIRUB UR QL: NEGATIVE
BUN SERPL-MCNC: 7 MG/DL (ref 6–20)
BUN/CREAT SERPL: 12 (ref 12–20)
CALCIUM SERPL-MCNC: 9.3 MG/DL (ref 8.5–10.1)
CHLORIDE SERPL-SCNC: 105 MMOL/L (ref 97–108)
CLUE CELLS VAG QL WET PREP: NORMAL
CO2 SERPL-SCNC: 25 MMOL/L (ref 21–32)
COLOR UR: ABNORMAL
CREAT SERPL-MCNC: 0.57 MG/DL (ref 0.55–1.02)
DIFFERENTIAL METHOD BLD: ABNORMAL
EOSINOPHIL # BLD: 0.2 K/UL (ref 0–0.4)
EOSINOPHIL NFR BLD: 2 % (ref 0–7)
EPITH CASTS URNS QL MICRO: ABNORMAL /LPF
ERYTHROCYTE [DISTWIDTH] IN BLOOD BY AUTOMATED COUNT: 13.2 % (ref 11.5–14.5)
GLOBULIN SER CALC-MCNC: 3.4 G/DL (ref 2–4)
GLUCOSE SERPL-MCNC: 99 MG/DL (ref 65–100)
GLUCOSE UR STRIP.AUTO-MCNC: NEGATIVE MG/DL
HCT VFR BLD AUTO: 38.8 % (ref 35–47)
HGB BLD-MCNC: 12.7 G/DL (ref 11.5–16)
HGB UR QL STRIP: ABNORMAL
HYALINE CASTS URNS QL MICRO: ABNORMAL /LPF (ref 0–5)
IMM GRANULOCYTES # BLD: 0 K/UL (ref 0–0.04)
IMM GRANULOCYTES NFR BLD AUTO: 0 % (ref 0–0.5)
KETONES UR QL STRIP.AUTO: 15 MG/DL
KOH PREP SPEC: NORMAL
LEUKOCYTE ESTERASE UR QL STRIP.AUTO: ABNORMAL
LIPASE SERPL-CCNC: 125 U/L (ref 73–393)
LYMPHOCYTES # BLD: 2.5 K/UL (ref 0.8–3.5)
LYMPHOCYTES NFR BLD: 25 % (ref 12–49)
MCH RBC QN AUTO: 30.8 PG (ref 26–34)
MCHC RBC AUTO-ENTMCNC: 32.7 G/DL (ref 30–36.5)
MCV RBC AUTO: 94.2 FL (ref 80–99)
MONOCYTES # BLD: 0.9 K/UL (ref 0–1)
MONOCYTES NFR BLD: 8 % (ref 5–13)
NEUTS SEG # BLD: 6.5 K/UL (ref 1.8–8)
NEUTS SEG NFR BLD: 64 % (ref 32–75)
NITRITE UR QL STRIP.AUTO: NEGATIVE
NRBC # BLD: 0 K/UL (ref 0–0.01)
NRBC BLD-RTO: 0 PER 100 WBC
PH UR STRIP: 7.5 [PH] (ref 5–8)
PLATELET # BLD AUTO: 331 K/UL (ref 150–400)
PMV BLD AUTO: 8.7 FL (ref 8.9–12.9)
POTASSIUM SERPL-SCNC: 4 MMOL/L (ref 3.5–5.1)
PROT SERPL-MCNC: 7.5 G/DL (ref 6.4–8.2)
PROT UR STRIP-MCNC: NEGATIVE MG/DL
RBC # BLD AUTO: 4.12 M/UL (ref 3.8–5.2)
RBC #/AREA URNS HPF: ABNORMAL /HPF (ref 0–5)
SERVICE CMNT-IMP: NORMAL
SODIUM SERPL-SCNC: 140 MMOL/L (ref 136–145)
SP GR UR REFRACTOMETRY: 1.01 (ref 1–1.03)
T VAGINALIS VAG QL WET PREP: NORMAL
UR CULT HOLD, URHOLD: NORMAL
UROBILINOGEN UR QL STRIP.AUTO: 0.2 EU/DL (ref 0.2–1)
WBC # BLD AUTO: 10.2 K/UL (ref 3.6–11)
WBC URNS QL MICRO: ABNORMAL /HPF (ref 0–4)

## 2018-05-09 PROCEDURE — 87210 SMEAR WET MOUNT SALINE/INK: CPT | Performed by: EMERGENCY MEDICINE

## 2018-05-09 PROCEDURE — 87491 CHLMYD TRACH DNA AMP PROBE: CPT | Performed by: EMERGENCY MEDICINE

## 2018-05-09 PROCEDURE — 96372 THER/PROPH/DIAG INJ SC/IM: CPT

## 2018-05-09 PROCEDURE — 74011000250 HC RX REV CODE- 250: Performed by: EMERGENCY MEDICINE

## 2018-05-09 PROCEDURE — 80053 COMPREHEN METABOLIC PANEL: CPT | Performed by: EMERGENCY MEDICINE

## 2018-05-09 PROCEDURE — 74011250636 HC RX REV CODE- 250/636: Performed by: EMERGENCY MEDICINE

## 2018-05-09 PROCEDURE — 74011250637 HC RX REV CODE- 250/637: Performed by: EMERGENCY MEDICINE

## 2018-05-09 PROCEDURE — 85025 COMPLETE CBC W/AUTO DIFF WBC: CPT | Performed by: EMERGENCY MEDICINE

## 2018-05-09 PROCEDURE — 36415 COLL VENOUS BLD VENIPUNCTURE: CPT | Performed by: EMERGENCY MEDICINE

## 2018-05-09 PROCEDURE — 83690 ASSAY OF LIPASE: CPT | Performed by: EMERGENCY MEDICINE

## 2018-05-09 PROCEDURE — 81001 URINALYSIS AUTO W/SCOPE: CPT | Performed by: EMERGENCY MEDICINE

## 2018-05-09 PROCEDURE — 99284 EMERGENCY DEPT VISIT MOD MDM: CPT

## 2018-05-09 RX ORDER — AZITHROMYCIN 250 MG/1
1000 TABLET, FILM COATED ORAL
Status: COMPLETED | OUTPATIENT
Start: 2018-05-09 | End: 2018-05-09

## 2018-05-09 RX ADMIN — AZITHROMYCIN 1000 MG: 250 TABLET, FILM COATED ORAL at 15:29

## 2018-05-09 RX ADMIN — LIDOCAINE HYDROCHLORIDE 250 MG: 10 INJECTION, SOLUTION EPIDURAL; INFILTRATION; INTRACAUDAL; PERINEURAL at 15:29

## 2018-05-09 NOTE — ED PROVIDER NOTES
HPI Comments: 44 y.o. female with past medical history significant for meningitis, high cholesterol, and sleep apnea, who presents ambulatory to the ED with cc of abdominal pain. Pt states she was dx with UTI 3 weeks ago and believes her symptoms are still present despite use of abx starting on 4/12/18. She adds that she is 9 weeks pregnant and had a normal US 1 month ago. Pt reports R>L suprapubic abdominal pain x 3 weeks with associated dysuria, subjective fever, and nausea with urination. She states her urine is malodorous. She states she has diffuse right abdominal pain d/t gallstones. Pt reports she had been experiencing vaginal spotting with brown blood but now has yellow discharge. She denies hx of STI. She specifically denies any CP or SOB. There are no other acute medical concerns at this time. Social Hx: former Tobacco use, denies EtOH use, denies Illicit Drug use  PCP: None    Note written by Koko Solis, as dictated by Stu Bazzi. Yulia Romero MD 12:54 PM      The history is provided by the patient. No  was used. Past Medical History:   Diagnosis Date    High cholesterol     Infectious disease     Herpes    Meningitis     Sleep apnea        Past Surgical History:   Procedure Laterality Date    HX GYN      2 D&C procedures at age 22    HX ORTHOPAEDIC      right leg tissue removal         History reviewed. No pertinent family history. Social History     Social History    Marital status: SINGLE     Spouse name: N/A    Number of children: N/A    Years of education: N/A     Occupational History    Not on file. Social History Main Topics    Smoking status: Former Smoker    Smokeless tobacco: Never Used    Alcohol use No    Drug use: No    Sexual activity: Not on file     Other Topics Concern    Not on file     Social History Narrative         ALLERGIES: Aspirin and Pcn [penicillins]    Review of Systems   Constitutional: Positive for fever.  Negative for chills. HENT: Negative for ear pain and sore throat. Eyes: Negative for pain. Respiratory: Negative for chest tightness and shortness of breath. Cardiovascular: Negative for chest pain and leg swelling. Gastrointestinal: Positive for abdominal pain and nausea. Negative for vomiting. Genitourinary: Positive for dysuria and vaginal discharge. Negative for flank pain. Musculoskeletal: Negative for back pain. Skin: Negative for rash. Neurological: Negative for headaches. All other systems reviewed and are negative. Vitals:    05/09/18 1145   BP: 145/80   Pulse: 84   Resp: 16   Temp: 98.4 °F (36.9 °C)   SpO2: 100%   Weight: 83.9 kg (184 lb 15.5 oz)   Height: 5' 4\" (1.626 m)            Physical Exam   Constitutional: She appears well-developed and well-nourished. HENT:   Head: Normocephalic and atraumatic. Mouth/Throat: Oropharynx is clear and moist.   Eyes: Conjunctivae and EOM are normal. Pupils are equal, round, and reactive to light. No scleral icterus. Neck: Neck supple. No tracheal deviation present. Cardiovascular: Normal rate, regular rhythm, normal heart sounds and intact distal pulses. Pulmonary/Chest: Effort normal and breath sounds normal. No respiratory distress. Abdominal: Soft. She exhibits no distension. There is no tenderness. There is no rebound and no guarding. Genitourinary:   Genitourinary Comments: Pelvic Exam: Greenish discharge from cervical os, cervix is inflamed, had cervical motion tenderness. Musculoskeletal: She exhibits no edema. Neurological: She is alert. Skin: Skin is warm and dry. Psychiatric: She has a normal mood and affect. Nursing note and vitals reviewed. Note written by Koko Pierre, as dictated by Jessica Durant.  Allen Ruano MD 12:54 PM      MDM  Number of Diagnoses or Management Options  PID (pelvic inflammatory disease):   Diagnosis management comments: 45 yo female at 9 weeks gestation presents with abdominal pain, vaginal discharge, and dysuria. Will treat with rocephin and azithromycin  Follow up closely with OB-GYN  Return to the emergency department for new/ worsening symptoms or other concerns     Marian Celestin.  Shahida Yates MD          ED Course       Procedures    1:54 PM  At bedside for pelvic exam.

## 2018-05-09 NOTE — DISCHARGE INSTRUCTIONS
Pelvic Inflammatory Disease: Care Instructions  Your Care Instructions    Pelvic inflammatory disease, or PID, is an infection of a woman's fallopian tubes and other reproductive organs. PID is usually caused by a sexually transmitted infection (STI), such as gonorrhea or chlamydia. PID can cause scars in the fallopian tubes, making it hard for a woman to get pregnant. Having one STI increases your risk for other STIs, such as genital herpes, genital warts, syphilis, and HIV. It is important to take all the medicine that was prescribed. PID can cause serious health problems if you do not complete your treatment. Follow-up care is a key part of your treatment and safety. Be sure to make and go to all appointments, and call your doctor if you are having problems. It's also a good idea to know your test results and keep a list of the medicines you take. How can you care for yourself at home? · Take your antibiotics as directed. Do not stop taking them just because you feel better. You need to take the full course of antibiotics. · Rest until your fever and pain have improved. · Take pain medicines exactly as directed. ¨ If the doctor gave you a prescription medicine for pain, take it as prescribed. ¨ If you are not taking a prescription pain medicine, ask your doctor if you can take an over-the-counter medicine. · Use a hot water bottle or a heating pad (set on low) on your belly for pain. · Do not douche. · Do not have sex or use tampons (you can use pads instead) until you have taken all the medicine, your pain is gone, and you feel completely well. · Talk to any sex partners you have had in the past 2 months. They need to be tested and may need to be treated for STIs. To prevent STIs  · Use latex condoms every time you have sex. Use them from the beginning to the end of sexual contact. · Talk to your partner before you have sex.  Find out if he or she has or is at risk for any sexually transmitted infection (STI). Keep in mind that a person may be able to spread an STI even if he or she does not have symptoms. · Do not have sex with anyone who has symptoms of an STI, such as sores on the genitals or mouth. · Having one sex partner (who does not have STIs and does not have sex with anyone else) is a good way to avoid STIs. When should you call for help? Call your doctor now or seek immediate medical care if:  ? · You have a new or higher fever. ? · You have unusual vaginal bleeding. ? · You have new or worse belly or pelvic pain. ? · You have vaginal discharge that has increased in amount or smells bad. ? Watch closely for changes in your health, and be sure to contact your doctor if:  ? · You do not get better as expected. Where can you learn more? Go to http://andreea-fernando.info/. Enter N294 in the search box to learn more about \"Pelvic Inflammatory Disease: Care Instructions. \"  Current as of: October 13, 2016  Content Version: 11.4  © 6540-0498 [email or web address]. Care instructions adapted under license by [employer] (which disclaims liability or warranty for this information). If you have questions about a medical condition or this instruction, always ask your healthcare professional. [address] any warranty or liability for your use of this information.

## 2018-05-09 NOTE — ED TRIAGE NOTES
Patient c/o chills, abdominal pain, urinary pain x 1 week. States she was recently treated for uti, completed antibiotic a week ago. Patient is 9 weeks pregnant.

## 2018-05-09 NOTE — ED NOTES
Discharged by provider. Discharged instructions reviewed by Dr. Giuliana Garcia. Pt leaves ambulatory with discharge paperwork. Denies questions at this time.

## 2018-05-11 LAB
C TRACH DNA SPEC QL NAA+PROBE: NEGATIVE
N GONORRHOEA DNA SPEC QL NAA+PROBE: NEGATIVE
SAMPLE TYPE: NORMAL
SERVICE CMNT-IMP: NORMAL
SPECIMEN SOURCE: NORMAL

## 2018-05-30 ENCOUNTER — INITIAL PRENATAL (OUTPATIENT)
Dept: OBGYN CLINIC | Age: 38
End: 2018-05-30

## 2018-05-30 VITALS
HEIGHT: 64 IN | WEIGHT: 181 LBS | DIASTOLIC BLOOD PRESSURE: 60 MMHG | SYSTOLIC BLOOD PRESSURE: 128 MMHG | BODY MASS INDEX: 30.9 KG/M2

## 2018-05-30 DIAGNOSIS — F17.210 CIGARETTE SMOKER: ICD-10-CM

## 2018-05-30 DIAGNOSIS — N92.6 MISSED MENSES: ICD-10-CM

## 2018-05-30 DIAGNOSIS — Z32.01 PREGNANCY EXAMINATION OR TEST, POSITIVE RESULT: ICD-10-CM

## 2018-05-30 DIAGNOSIS — O09.521 ENCOUNTER FOR SUPERVISION OF MULTIGRAVIDA OF ADVANCED MATERNAL AGE IN FIRST TRIMESTER: Primary | ICD-10-CM

## 2018-05-30 DIAGNOSIS — O99.331 TOBACCO SMOKING COMPLICATING PREGNANCY IN FIRST TRIMESTER: ICD-10-CM

## 2018-05-30 DIAGNOSIS — Z3A.12 12 WEEKS GESTATION OF PREGNANCY: ICD-10-CM

## 2018-05-30 DIAGNOSIS — Z34.82 ENCOUNTER FOR SUPERVISION OF OTHER NORMAL PREGNANCY, SECOND TRIMESTER: ICD-10-CM

## 2018-05-30 LAB
NIPT, EXTERNAL: NORMAL
PAP SMEAR, EXTERNAL: NEGATIVE

## 2018-05-30 NOTE — PROGRESS NOTES
Current pregnancy history:    Juana Hodge is a 44 y.o. female who presents for the evaluation of pregnancy. Patient's last menstrual period was 2018 (approximate). LMP history:  The date of her LMP is almost certain. Her last menstrual period was normal and lasted for 4 to 5 days. A urine pregnancy test was positive 8 weeks ago. She was not on the pill at conception. Based on her LMP, her EDC is 18 and her EGA is 12 weeks, 5 days. Her menstrual cycles are regular and occur approximately every 28 days  and range from 3 to 5 days. The last menses lasted the usual number of days. Ultrasound data:  She had an  ultrasound done by the ultrasound tech today which revealed a viable pa pregnancy with a gestational age of 17 weeks and 1 day giving an EDC of 18. Pregnancy symptoms:    Since her LMP she has experienced  urinary frequency, breast tenderness, and nausea. She has been vomiting over the last few weeks. Associated signs and symptoms which she denies: dysuria, discharge, vaginal bleeding. She states she has not gained weight:  Approximately 0 pounds over the last few weeks. Relevant past pregnancy history:   She has the following pregnancy history: Her last pregnancy was uncomplicated. She has no history of  delivery. Relevant past medical history:(relevant to this pregnancy): noncontributory. Pap/Occupational history:  Last pap smear: unknown per patient Results: Normal      Her occupation is: Checks in  at a [employer]     Substance history: negative for tobacco and street drugs. Does smoke 1/2 PPD           Positive for nothing else. Exposure history: There is/are no indoor cat/s in the home. The patient was instructed to not change the cat litter. She admits close contact with children on a regular basis. She has had chicken pox or the vaccine in the past.   Patient denies issues with domestic violence.      Genetic Screening/Teratology Counseling: (Includes patient, baby's father, or anyone in either family with:)  3.  Patient's age >/= 28 at [facility]?-- 43.   2.  Thalassemia ([facility], [place], [address], or  background): MCV<80?--no.     3.  Neural tube defect (meningomyelocele, spina bifida, anencephaly)?--no.   4.  Congenital heart defect?--no.  5.  Down syndrome?--no.   6.  Emeterio-Sachs (Roman Catholic, [employer] [place])?--no.   7.  Canavan's Disease?--no.   8.  Familial Dysautonomia?--no.   9.  Sickle cell disease or trait ()? --no   The patient has not been tested for sickle trait  10. Hemophilia or other blood disorders?--no. 11.  Muscular dystrophy?--no. 12.  Cystic fibrosis?--no. 13.  Sipsey's Chorea?--no. 14.  Mental retardation/autism (if yes was person tested for Fragile X)?--no. 15.  Other inherited genetic or chromosomal disorder?--no. 12.  Maternal metabolic disorder (DM, PKU, etc)?--no. 17.  Patient or FOB with a child with a birth defect not listed above?--no.  17a. Patient or FOB with a birth defect themselves?--no. 18.  Recurrent pregnancy loss, or stillbirth?--no. 19.  Any medications since LMP other than prenatal vitamins (include vitamins, supplements, OTC meds, drugs, alcohol)?--no. 20.  Any other genetic/environmental exposure to discuss?--no. Infection History:  1. Lives with someone with TB or TB exposed?--no.   2.  Patient or partner has history of genital herpes?--no.  3.  Rash or viral illness since LMP?--no.    4.  History of STD (GC, CT, HPV, syphilis, HIV)? --no   5. Other: OTHER? Past Medical History:   Diagnosis Date    Gallstones     Herpes     High cholesterol     Meningitis     Sleep apnea      Past Surgical History:   Procedure Laterality Date    HX DILATION AND CURETTAGE      age 22    HX ORTHOPAEDIC      right leg tissue removal     Social History     Occupational History    Not on file.      Social History Main Topics    Smoking status: Current Every Day Smoker     Years: 20.00     Types: Cigarettes    Smokeless tobacco: Never Used    Alcohol use No    Drug use: No    Sexual activity: Yes     Partners: Male     Birth control/ protection: None     Family History   Problem Relation Age of Onset    Hypertension Mother     Diabetes Mother     Breast Cancer Maternal Grandmother     Hypertension Maternal Grandmother     Ovarian Cancer Maternal Grandmother     Kidney Disease Maternal Grandmother     Diabetes Maternal Uncle        Allergies   Allergen Reactions    Aspirin Nausea and Vomiting    Pcn [Penicillins] Other (comments)     Prior to Admission medications    Medication Sig Start Date End Date Taking? Authorizing Provider   OTHER bcp    Zuri Hale MD   omeprazole (PRILOSEC) 20 mg capsule Take 20 mg by mouth as needed. Zuri Hale MD   cpap machine kit by Does Not Apply route.     Zuri Hale MD        Review of Systems: History obtained from the patient  Constitutional: negative for weight loss, fever, night sweats  HEENT: negative for hearing loss, earache, congestion, snoring, sorethroat  CV: negative for chest pain, palpitations, edema  Resp: negative for cough, shortness of breath, wheezing  Breast: negative for breast lumps, nipple discharge, galactorrhea  GI: negative for change in bowel habits, abdominal pain, black or bloody stools--some diarrhea  : negative for frequency, dysuria, hematuria, vaginal discharge  MSK: negative for back pain, joint pain, muscle pain  Skin: negative for itching, rash, hives  Neuro: negative for dizziness, headache, confusion, weakness  Psych: negative for anxiety, depression, change in mood  Heme/lymph: negative for bleeding, bruising, pallor    Objective:  Visit Vitals    /60    Ht 5' 4\" (1.626 m)    Wt 181 lb (82.1 kg)    LMP 03/02/2018 (Approximate)    BMI 31.07 kg/m2       Physical Exam:   PHYSICAL EXAMINATION    Constitutional  · Appearance: well-nourished, well developed, alert, in no acute distress    HENT  · Head  · Face: appears normal  · Eyes: appear normal  · Ears: normal  · Mouth: normal  · Lips: no lesions    Neck  · Inspection/Palpation: normal appearance, no masses or tenderness  · Lymph Nodes: no lymphadenopathy present  · Thyroid: gland size normal, nontender, no nodules or masses present on palpation    Chest  · Respiratory Effort: breathing unlabored  · Auscultation: normal breath sounds    Cardiovascular  · Heart:  · Auscultation: regular rate and rhythm without murmur    Breasts  · Inspection of Breasts: breasts symmetrical, no skin changes, no discharge present, nipple appearance normal, no skin retraction present  · Palpation of Breasts and Axillae: no masses present on palpation, no breast tenderness  · Axillary Lymph Nodes: no lymphadenopathy present    Gastrointestinal  · Abdominal Examination: abdomen non-tender to palpation, normal bowel sounds, no masses present  · Liver and spleen: no hepatomegaly present, spleen not palpable  · Hernias: no hernias identified    Genitourinary  · External Genitalia: normal appearance for age, no discharge present, no tenderness present, no inflammatory lesions present, no masses present, no atrophy present  · Vagina: normal vaginal vault without central or paravaginal defects, no discharge present, no inflammatory lesions present, no masses present  · Bladder: non-tender to palpation  · Urethra: appears normal  · Cervix: normal   · Uterus: enlarged, normal shape, soft  · Adnexa: no adnexal tenderness present, no adnexal masses present  · Perineum: perineum within normal limits, no evidence of trauma, no rashes or skin lesions present  · Anus: anus within normal limits, no hemorrhoids present  · Inguinal Lymph Nodes: no lymphadenopathy present    Skin  · General Inspection: no rash, no lesions identified    Neurologic/Psychiatric  · Mental Status:  · Orientation: grossly oriented to person, place and time  · Mood and Affect: mood normal, affect appropriate    Assessment:   Intrauterine pregnancy with the following problems identified: AMA/HSV hx/Gallstone/smoker        Plan:     Offered CF testing, CVS, Nuchal Translucency, MSAFP, amnio, and discussed NIPT  Course of pregnancy discussed including visit schedule, routine U/S, glucola testing, etc.  Avoid alcoholic beverages and illicit/recreational drugs use  Take prenatal vitamins or folic acid daily. Hospital and practice style discussed with coverage system. Discussed nutrition, toxoplasmosis precautions, sexual activity, exercise, need for influenza vaccine, environmental and work hazards, travel advice, screen for domestic violence, need for seat belts. Discussed seafood, unpasteurized dairy products, deli meat, artificial sweeteners, and caffeine. Information on prenatal classes/breastfeeding given. Information on circumcision given  Patient encouraged not to smoke. Discussed current prescription drug use. Given medication list.  Discussed the use of over the counter medications and chemicals. Route of delivery discussed, including risks, benefits, and alternatives of  versus repeat LTCS. Pt understands risk of hemorrhage during pregnancy and post delivery and would accept blood products if necessary in life-threatening emergencies      Handouts given to pt.

## 2018-06-01 LAB
ABO GROUP BLD: NORMAL
ANTIBODY SCREEN, EXTERNAL: NEGATIVE
BACTERIA UR CULT: NORMAL
BLD GP AB SCN SERPL QL: NEGATIVE
CHLAMYDIA, EXTERNAL: NEGATIVE
CYTOLOGIST CVX/VAG CYTO: NORMAL
CYTOLOGY CVX/VAG DOC THIN PREP: NORMAL
CYTOLOGY HISTORY:: NORMAL
DX ICD CODE: NORMAL
HBSAG, EXTERNAL: NEGATIVE
HBV SURFACE AG SERPL QL IA: NEGATIVE
HIV 1+2 AB+HIV1 P24 AG SERPL QL IA: NON REACTIVE
HIV, EXTERNAL: NEGATIVE
HPV I/H RISK 1 DNA CVX QL PROBE+SIG AMP: NEGATIVE
Lab: NORMAL
N. GONORRHEA, EXTERNAL: NEGATIVE
OTHER STN SPEC: NORMAL
PATH REPORT.FINAL DX SPEC: NORMAL
RH BLD: POSITIVE
RUBELLA, EXTERNAL: NORMAL
RUBV IGG SERPL IA-ACNC: 1.82 INDEX
STAT OF ADQ CVX/VAG CYTO-IMP: NORMAL
T PALLIDUM AB SER QL IA: NEGATIVE
T. PALLIDUM, EXTERNAL: NEGATIVE
TYPE, ABO & RH, EXTERNAL: NORMAL
URINALYSIS, EXTERNAL: NEGATIVE

## 2018-06-25 LAB — AFP, MATERNAL, EXTERNAL: NEGATIVE

## 2018-06-26 ENCOUNTER — ROUTINE PRENATAL (OUTPATIENT)
Dept: OBGYN CLINIC | Age: 38
End: 2018-06-26

## 2018-06-26 VITALS
HEIGHT: 64 IN | WEIGHT: 189 LBS | SYSTOLIC BLOOD PRESSURE: 124 MMHG | BODY MASS INDEX: 32.27 KG/M2 | DIASTOLIC BLOOD PRESSURE: 62 MMHG

## 2018-06-26 DIAGNOSIS — O99.332 TOBACCO SMOKING COMPLICATING PREGNANCY IN SECOND TRIMESTER: ICD-10-CM

## 2018-06-26 DIAGNOSIS — Z3A.16 16 WEEKS GESTATION OF PREGNANCY: ICD-10-CM

## 2018-06-26 DIAGNOSIS — O09.522 ENCOUNTER FOR SUPERVISION OF MULTIGRAVIDA OF ADVANCED MATERNAL AGE IN SECOND TRIMESTER: Primary | ICD-10-CM

## 2018-06-26 DIAGNOSIS — F17.210 CIGARETTE SMOKER: ICD-10-CM

## 2018-06-26 DIAGNOSIS — Z34.82 ENCOUNTER FOR SUPERVISION OF OTHER NORMAL PREGNANCY, SECOND TRIMESTER: ICD-10-CM

## 2018-06-26 NOTE — PATIENT INSTRUCTIONS
Weeks 14 to 18 of Your Pregnancy: Care Instructions  Your Care Instructions    During this time, you may start to \"show,\" so that you look pregnant to people around you. You may also notice some changes in your skin, such as itchy spots on your palms or acne on your face. Your baby is now able to pass urine, and your baby's first stool (meconium) is starting to collect in his or her intestines. Hair is also beginning to grow on your baby's head. At your next visit, between weeks 18 and 20, your doctor may do an ultrasound test. The test allows your doctor to check for certain problems. Your doctor can also tell the sex of your baby. This is a good time to think about whether you want to know whether your baby is a boy or a girl. Talk to your doctor about getting a flu shot to help keep you healthy during your pregnancy. As your pregnancy moves along, it is common to worry or feel anxious. Your body is changing a lot. And you are thinking about giving birth, the health of your baby, and becoming a parent. You can learn to cope with any anxiety and stress you feel. Follow-up care is a key part of your treatment and safety. Be sure to make and go to all appointments, and call your doctor if you are having problems. It's also a good idea to know your test results and keep a list of the medicines you take. How can you care for yourself at home? ?Reduce stress  ? · Ask for help with cooking and housekeeping. ? · Figure out who or what causes your stress. Avoid these people or situations as much as possible. ? · Relax every day. Taking 10- to 15-minute breaks can make a big difference. Take a walk, listen to music, or take a warm bath. ? · Learn relaxation techniques at prenatal or yoga class. Or buy a relaxation tape. ? · List your fears about having a baby and becoming a parent. Share the list with someone you trust. Decide which worries are really small, and try to let them go. Exercise  ?  · If you did not exercise much before pregnancy, start slowly. Walking is best. Diannia Essex yourself, and do a little more every day. ? · Brisk walking, easy jogging, low-impact aerobics, water aerobics, and yoga are good choices. Some sports, such as scuba diving, horseback riding, downhill skiing, gymnastics, and water skiing, are not a good idea. ? · Try to do at least 2½ hours a week of moderate exercise, such as a fast walk. One way to do this is to be active 30 minutes a day, at least 5 days a week. It's fine to be active in blocks of 10 minutes or more throughout your day and week. ? · Wear loose clothing. And wear shoes and a bra that provide good support. ? · Warm up and cool down to start and finish your exercise. ? · If you want to use weights, be sure to use light weights. They reduce stress on your joints. ?Stay at the best weight for you  ? · Experts recommend that you gain about 1 pound a month during the first 3 months of your pregnancy. ? · Experts recommend that you gain about 1 pound a week during your last 6 months of pregnancy, for a total weight gain of 25 to 35 pounds. ? · If you are underweight, you will need to gain more weight (about 28 to 40 pounds). ? · If you are overweight, you may not need to gain as much weight (about 15 to 25 pounds). ? · If you are gaining weight too fast, use common sense. Exercise every day, and limit sweets, fast foods, and fats. Choose lean meats, fruits, and vegetables. ? · If you are having twins or more, your doctor may refer you to a dietitian. Where can you learn more? Go to http://andreea-fernando.info/. Enter Q872 in the search box to learn more about \"Weeks 14 to 18 of Your Pregnancy: Care Instructions. \"  Current as of: March 16, 2017  Content Version: 11.4  © 7559-5077 [employer]. Care instructions adapted under license by [employer] (which disclaims liability or warranty for this information).  If you have questions about a medical condition or this instruction, always ask your healthcare professional. [place] [address] any warranty or liability for your use of this information.

## 2018-06-30 LAB
AFP ADJ MOM SERPL: 1.2
AFP INTERP SERPL-IMP: NORMAL
AFP INTERP SERPL-IMP: NORMAL
AFP SERPL-MCNC: 34.1 NG/ML
AGE AT DELIVERY: 39.4 YR
COMMENT, 018013: NORMAL
GA METHOD: NORMAL
GA: 16 WEEKS
IDDM PATIENT QL: NO
MULTIPLE PREGNANCY: NO
NEURAL TUBE DEFECT RISK FETUS: 6499 %
RESULTS, 017004: NORMAL

## 2018-07-31 ENCOUNTER — ROUTINE PRENATAL (OUTPATIENT)
Dept: OBGYN CLINIC | Age: 38
End: 2018-07-31

## 2018-07-31 VITALS
BODY MASS INDEX: 34.31 KG/M2 | WEIGHT: 201 LBS | HEIGHT: 64 IN | DIASTOLIC BLOOD PRESSURE: 60 MMHG | SYSTOLIC BLOOD PRESSURE: 116 MMHG

## 2018-07-31 DIAGNOSIS — O09.522 ENCOUNTER FOR SUPERVISION OF MULTIGRAVIDA OF ADVANCED MATERNAL AGE IN SECOND TRIMESTER: Primary | ICD-10-CM

## 2018-07-31 DIAGNOSIS — F17.210 CIGARETTE SMOKER: ICD-10-CM

## 2018-07-31 DIAGNOSIS — Z3A.21 21 WEEKS GESTATION OF PREGNANCY: ICD-10-CM

## 2018-07-31 DIAGNOSIS — O99.332 TOBACCO SMOKING COMPLICATING PREGNANCY IN SECOND TRIMESTER: ICD-10-CM

## 2018-07-31 DIAGNOSIS — Z34.82 ENCOUNTER FOR SUPERVISION OF OTHER NORMAL PREGNANCY, SECOND TRIMESTER: ICD-10-CM

## 2018-07-31 NOTE — PATIENT INSTRUCTIONS

## 2018-07-31 NOTE — PROGRESS NOTES
A SINGLE VIABLE IUP AT 21W4D GA BY LMP. FETAL CARDIAC MOTION OBSERVED. FETAL ANATOMY WELL VISUALIZED. A LEFT IVEF IS SEEN. ANATOMY NOT WELL SEEN: PROFILE. FOLLOW UP NEEDED TO COMPLETE SURVEY. APPROPRIATE GROWTH MEASURED; SIZE = DATES. SHARRI, CERVIX AND PLACENTA APPEAR WITHIN NORMAL LIMITS.   GENDER: XX

## 2018-08-28 ENCOUNTER — ROUTINE PRENATAL (OUTPATIENT)
Dept: OBGYN CLINIC | Age: 38
End: 2018-08-28

## 2018-08-28 VITALS — WEIGHT: 204 LBS | DIASTOLIC BLOOD PRESSURE: 74 MMHG | SYSTOLIC BLOOD PRESSURE: 122 MMHG | BODY MASS INDEX: 35.02 KG/M2

## 2018-08-28 DIAGNOSIS — Z34.82 ENCOUNTER FOR SUPERVISION OF OTHER NORMAL PREGNANCY, SECOND TRIMESTER: Primary | ICD-10-CM

## 2018-09-12 LAB — GTT, 1 HR, GLUCOLA, EXTERNAL: 107

## 2018-09-14 PROBLEM — Z34.83 ENCOUNTER FOR SUPERVISION OF OTHER NORMAL PREGNANCY, THIRD TRIMESTER: Status: ACTIVE | Noted: 2018-05-30

## 2018-09-19 ENCOUNTER — ROUTINE PRENATAL (OUTPATIENT)
Dept: OBGYN CLINIC | Age: 38
End: 2018-09-19

## 2018-09-19 VITALS — WEIGHT: 210 LBS | SYSTOLIC BLOOD PRESSURE: 118 MMHG | DIASTOLIC BLOOD PRESSURE: 64 MMHG | BODY MASS INDEX: 36.05 KG/M2

## 2018-09-19 DIAGNOSIS — Z23 ENCOUNTER FOR IMMUNIZATION: ICD-10-CM

## 2018-09-19 DIAGNOSIS — Z34.83 PRENATAL CARE, SUBSEQUENT PREGNANCY, THIRD TRIMESTER: Primary | ICD-10-CM

## 2018-09-19 DIAGNOSIS — Z34.83 ENCOUNTER FOR SUPERVISION OF OTHER NORMAL PREGNANCY, THIRD TRIMESTER: ICD-10-CM

## 2018-09-19 LAB
HCT, EXTERNAL: 30.1
HGB, EXTERNAL: 10
PLATELET CNT,   EXTERNAL: 378

## 2018-09-19 NOTE — PROGRESS NOTES
43year old  30w6d pregnant patient given the 0.5 t-dap injection as per MD order. Patient signed consent. Patient tolerated injection in left deltoid without complications.

## 2018-09-20 LAB
ERYTHROCYTE [DISTWIDTH] IN BLOOD BY AUTOMATED COUNT: 14.3 % (ref 12.3–15.4)
GLUCOSE 1H P 50 G GLC PO SERPL-MCNC: 107 MG/DL (ref 65–129)
HCT VFR BLD AUTO: 30.1 % (ref 34–46.6)
HGB BLD-MCNC: 10 G/DL (ref 11.1–15.9)
MCH RBC QN AUTO: 31.1 PG (ref 26.6–33)
MCHC RBC AUTO-ENTMCNC: 33.2 G/DL (ref 31.5–35.7)
MCV RBC AUTO: 94 FL (ref 79–97)
PLATELET # BLD AUTO: 378 X10E3/UL (ref 150–379)
RBC # BLD AUTO: 3.22 X10E6/UL (ref 3.77–5.28)
WBC # BLD AUTO: 18.7 X10E3/UL (ref 3.4–10.8)

## 2018-09-20 RX ORDER — LANOLIN ALCOHOL/MO/W.PET/CERES
325 CREAM (GRAM) TOPICAL 2 TIMES DAILY
Qty: 60 TAB | Refills: 5 | Status: CANCELLED | OUTPATIENT
Start: 2018-09-20

## 2018-10-03 ENCOUNTER — ROUTINE PRENATAL (OUTPATIENT)
Dept: OBGYN CLINIC | Age: 38
End: 2018-10-03

## 2018-10-03 VITALS
HEIGHT: 64 IN | DIASTOLIC BLOOD PRESSURE: 74 MMHG | BODY MASS INDEX: 36.7 KG/M2 | WEIGHT: 215 LBS | SYSTOLIC BLOOD PRESSURE: 118 MMHG

## 2018-10-03 DIAGNOSIS — Z34.83 PRENATAL CARE, SUBSEQUENT PREGNANCY, THIRD TRIMESTER: Primary | ICD-10-CM

## 2018-10-03 DIAGNOSIS — Z23 ENCOUNTER FOR IMMUNIZATION: ICD-10-CM

## 2018-10-05 NOTE — PROGRESS NOTES
Administered flu vaccine per MD order. Patient consent signed. Injection given IM in right deltoid. Patient tolerated well, no complications, no side effects.

## 2018-10-17 ENCOUNTER — ROUTINE PRENATAL (OUTPATIENT)
Dept: OBGYN CLINIC | Age: 38
End: 2018-10-17

## 2018-10-17 VITALS — BODY MASS INDEX: 37.42 KG/M2 | SYSTOLIC BLOOD PRESSURE: 122 MMHG | WEIGHT: 218 LBS | DIASTOLIC BLOOD PRESSURE: 76 MMHG

## 2018-10-17 DIAGNOSIS — Z34.83 PRENATAL CARE, SUBSEQUENT PREGNANCY, THIRD TRIMESTER: Primary | ICD-10-CM

## 2018-10-31 ENCOUNTER — ROUTINE PRENATAL (OUTPATIENT)
Dept: OBGYN CLINIC | Age: 38
End: 2018-10-31

## 2018-10-31 VITALS — WEIGHT: 220 LBS | SYSTOLIC BLOOD PRESSURE: 122 MMHG | DIASTOLIC BLOOD PRESSURE: 72 MMHG | BODY MASS INDEX: 37.76 KG/M2

## 2018-10-31 DIAGNOSIS — Z34.83 PRENATAL CARE, SUBSEQUENT PREGNANCY, THIRD TRIMESTER: Primary | ICD-10-CM

## 2018-10-31 LAB — GRBS, EXTERNAL: NEGATIVE

## 2018-11-04 LAB — B-HEM STREP SPEC QL CULT: NEGATIVE

## 2018-11-07 ENCOUNTER — ROUTINE PRENATAL (OUTPATIENT)
Dept: OBGYN CLINIC | Age: 38
End: 2018-11-07

## 2018-11-07 VITALS — WEIGHT: 227.4 LBS | DIASTOLIC BLOOD PRESSURE: 72 MMHG | SYSTOLIC BLOOD PRESSURE: 120 MMHG | BODY MASS INDEX: 39.03 KG/M2

## 2018-11-07 DIAGNOSIS — Z34.83 PRENATAL CARE, SUBSEQUENT PREGNANCY, THIRD TRIMESTER: Primary | ICD-10-CM

## 2018-11-07 NOTE — PROGRESS NOTES
Problem List  Date Reviewed: 10/17/2018          Codes Class Noted    Encounter for supervision of other normal pregnancy, third trimester ICD-10-CM: Z34.83  ICD-9-CM: V22.1  5/30/2018    Overview Addendum 10/5/2018 11:14 AM by Sofie HARO  Anemic 10 at 28  US profile incomplete--repeat at 28=normal  HSV hx with no recur--rx at 35 weeks  Gallstone  Smoker   Flu shot received 10/3/18

## 2018-11-14 ENCOUNTER — ROUTINE PRENATAL (OUTPATIENT)
Dept: OBGYN CLINIC | Age: 38
End: 2018-11-14

## 2018-11-14 VITALS
WEIGHT: 227.2 LBS | DIASTOLIC BLOOD PRESSURE: 82 MMHG | HEIGHT: 64 IN | SYSTOLIC BLOOD PRESSURE: 134 MMHG | BODY MASS INDEX: 38.79 KG/M2

## 2018-11-14 DIAGNOSIS — Z34.83 PRENATAL CARE, SUBSEQUENT PREGNANCY, THIRD TRIMESTER: Primary | ICD-10-CM

## 2018-11-21 ENCOUNTER — ROUTINE PRENATAL (OUTPATIENT)
Dept: OBGYN CLINIC | Age: 38
End: 2018-11-21

## 2018-11-21 VITALS
SYSTOLIC BLOOD PRESSURE: 124 MMHG | DIASTOLIC BLOOD PRESSURE: 72 MMHG | WEIGHT: 229 LBS | BODY MASS INDEX: 39.09 KG/M2 | HEIGHT: 64 IN

## 2018-11-21 DIAGNOSIS — Z34.83 PRENATAL CARE, SUBSEQUENT PREGNANCY, THIRD TRIMESTER: Primary | ICD-10-CM

## 2018-11-21 NOTE — PATIENT INSTRUCTIONS

## 2018-11-30 ENCOUNTER — ROUTINE PRENATAL (OUTPATIENT)
Dept: OBGYN CLINIC | Age: 38
End: 2018-11-30

## 2018-11-30 VITALS
WEIGHT: 237 LBS | SYSTOLIC BLOOD PRESSURE: 126 MMHG | HEIGHT: 64 IN | DIASTOLIC BLOOD PRESSURE: 70 MMHG | BODY MASS INDEX: 40.46 KG/M2

## 2018-11-30 DIAGNOSIS — Z34.83 ENCOUNTER FOR SUPERVISION OF OTHER NORMAL PREGNANCY, THIRD TRIMESTER: Primary | ICD-10-CM

## 2018-11-30 NOTE — PATIENT INSTRUCTIONS
Week 39 of Your Pregnancy: Care Instructions  Your Care Instructions    During these final weeks, you may feel anxious to see your new baby. [place] babies often look different from what you see in pictures or movies. Right after birth, their heads may have a strange shape. Their eyes may be puffy. And their genitals may be swollen. They may also have very dry skin, or red marks on the eyelids, nose, or neck. Still, most parents think their babies are beautiful. Follow-up care is a key part of your treatment and safety. Be sure to make and go to all appointments, and call your doctor if you are having problems. It's also a good idea to know your test results and keep a list of the medicines you take. How can you care for yourself at home? Prepare to breastfeed  · If you are breastfeeding, continue to eat healthy foods. · If your health care provider recommends it, keep taking your prenatal vitamins. · Talk to your doctor before you take any medicine or supplement. That's because some medicines can affect your breast milk. · You can help prevent sore nipples if you feed your baby in the correct position. Nurses will help you learn to do this. · Your  will need to be fed about every 1 to 3 hours. Choose the right birth control after your baby is born  · Women who are breastfeeding can still get pregnant. Use birth control if you don't want to get pregnant. · Intrauterine devices (IUDs) work for women who want to wait at least 2 years before getting pregnant again. They are safe to use while you are breastfeeding. · Depo-Provera can be used while you are breastfeeding. It is a shot you get every 3 months. · Birth control pills work well. But you need a different kind of pill while you are breastfeeding. And when you start taking these pills, you need to make sure to use another type of birth control until you start your second pack.   · Diaphragms, cervical caps, tubal implants, and condoms with spermicide work less well after birth. If you have a diaphragm or cervical cap, you will need to have it refitted. · Tubal ligation (tying your tubes) and vasectomy are both permanent. These are good options if you are sure you are done having children. Where can you learn more? Go to http://andreea-fernando.info/. Enter N755 in the search box to learn more about \"Week 39 of Your Pregnancy: Care Instructions. \"  Current as of: November 21, 2017  Content Version: 11.8  © 1600-1838 [employer]. Care instructions adapted under license by [employer] (which disclaims liability or warranty for this information). If you have questions about a medical condition or this instruction, always ask your healthcare professional. [address] any warranty or liability for your use of this information.

## 2018-12-04 ENCOUNTER — HOSPITAL ENCOUNTER (INPATIENT)
Age: 38
LOS: 3 days | Discharge: HOME | DRG: 560 | End: 2018-12-07
Attending: OBSTETRICS & GYNECOLOGY | Admitting: OBSTETRICS & GYNECOLOGY
Payer: MEDICAID

## 2018-12-04 ENCOUNTER — ROUTINE PRENATAL (OUTPATIENT)
Dept: OBGYN CLINIC | Age: 38
End: 2018-12-04

## 2018-12-04 VITALS
SYSTOLIC BLOOD PRESSURE: 106 MMHG | WEIGHT: 239 LBS | HEIGHT: 64 IN | BODY MASS INDEX: 40.8 KG/M2 | DIASTOLIC BLOOD PRESSURE: 74 MMHG

## 2018-12-04 DIAGNOSIS — Z34.83 ENCOUNTER FOR SUPERVISION OF OTHER NORMAL PREGNANCY, THIRD TRIMESTER: ICD-10-CM

## 2018-12-04 DIAGNOSIS — Z3A.39 39 WEEKS GESTATION OF PREGNANCY: ICD-10-CM

## 2018-12-04 DIAGNOSIS — O09.523 ENCOUNTER FOR SUPERVISION OF MULTIGRAVIDA OF ADVANCED MATERNAL AGE IN THIRD TRIMESTER: Primary | ICD-10-CM

## 2018-12-04 PROBLEM — Z34.90 PREGNANCY: Status: ACTIVE | Noted: 2018-12-04

## 2018-12-04 LAB
ERYTHROCYTE [DISTWIDTH] IN BLOOD BY AUTOMATED COUNT: 14.3 % (ref 11.5–14.5)
HCT VFR BLD AUTO: 35.1 % (ref 35–47)
HGB BLD-MCNC: 11.6 G/DL (ref 11.5–16)
MCH RBC QN AUTO: 31.4 PG (ref 26–34)
MCHC RBC AUTO-ENTMCNC: 33 G/DL (ref 30–36.5)
MCV RBC AUTO: 94.9 FL (ref 80–99)
NRBC # BLD: 0 K/UL (ref 0–0.01)
NRBC BLD-RTO: 0 PER 100 WBC
PLATELET # BLD AUTO: 405 K/UL (ref 150–400)
PMV BLD AUTO: 9 FL (ref 8.9–12.9)
RBC # BLD AUTO: 3.7 M/UL (ref 3.8–5.2)
WBC # BLD AUTO: 17.4 K/UL (ref 3.6–11)

## 2018-12-04 PROCEDURE — 85027 COMPLETE CBC AUTOMATED: CPT

## 2018-12-04 PROCEDURE — 99218 HC RM OBSERVATION: CPT

## 2018-12-04 PROCEDURE — 75410000002 HC LABOR FEE PER 1 HR: Performed by: OBSTETRICS & GYNECOLOGY

## 2018-12-04 PROCEDURE — 3E033VJ INTRODUCTION OF OTHER HORMONE INTO PERIPHERAL VEIN, PERCUTANEOUS APPROACH: ICD-10-PCS | Performed by: OBSTETRICS & GYNECOLOGY

## 2018-12-04 PROCEDURE — 10907ZC DRAINAGE OF AMNIOTIC FLUID, THERAPEUTIC FROM PRODUCTS OF CONCEPTION, VIA NATURAL OR ARTIFICIAL OPENING: ICD-10-PCS | Performed by: OBSTETRICS & GYNECOLOGY

## 2018-12-04 PROCEDURE — 76060000078 HC EPIDURAL ANESTHESIA: Performed by: ANESTHESIOLOGY

## 2018-12-04 PROCEDURE — 75410000003 HC RECOV DEL/VAG/CSECN EA 0.5 HR: Performed by: OBSTETRICS & GYNECOLOGY

## 2018-12-04 PROCEDURE — 74011250637 HC RX REV CODE- 250/637: Performed by: OBSTETRICS & GYNECOLOGY

## 2018-12-04 PROCEDURE — 75410000000 HC DELIVERY VAGINAL/SINGLE: Performed by: OBSTETRICS & GYNECOLOGY

## 2018-12-04 PROCEDURE — 59200 INSERT CERVICAL DILATOR: CPT | Performed by: OBSTETRICS & GYNECOLOGY

## 2018-12-04 PROCEDURE — 36415 COLL VENOUS BLD VENIPUNCTURE: CPT

## 2018-12-04 PROCEDURE — 65270000029 HC RM PRIVATE

## 2018-12-04 RX ORDER — OXYTOCIN/0.9 % SODIUM CHLORIDE 30/500 ML
0-20 PLASTIC BAG, INJECTION (ML) INTRAVENOUS
Status: DISCONTINUED | OUTPATIENT
Start: 2018-12-05 | End: 2018-12-07 | Stop reason: HOSPADM

## 2018-12-04 RX ORDER — CALCIUM CARBONATE 200(500)MG
400 TABLET,CHEWABLE ORAL AS NEEDED
Status: DISCONTINUED | OUTPATIENT
Start: 2018-12-04 | End: 2018-12-07 | Stop reason: HOSPADM

## 2018-12-04 RX ORDER — SODIUM CHLORIDE, SODIUM LACTATE, POTASSIUM CHLORIDE, CALCIUM CHLORIDE 600; 310; 30; 20 MG/100ML; MG/100ML; MG/100ML; MG/100ML
125 INJECTION, SOLUTION INTRAVENOUS CONTINUOUS
Status: DISCONTINUED | OUTPATIENT
Start: 2018-12-05 | End: 2018-12-07 | Stop reason: HOSPADM

## 2018-12-04 RX ORDER — HYDROCODONE BITARTRATE AND ACETAMINOPHEN 7.5; 325 MG/1; MG/1
1 TABLET ORAL
Status: ACTIVE | OUTPATIENT
Start: 2018-12-04 | End: 2018-12-04

## 2018-12-04 RX ORDER — DIPHENHYDRAMINE HCL 25 MG
25 CAPSULE ORAL
Status: DISCONTINUED | OUTPATIENT
Start: 2018-12-04 | End: 2018-12-07 | Stop reason: HOSPADM

## 2018-12-04 RX ORDER — SODIUM CHLORIDE 0.9 % (FLUSH) 0.9 %
5-10 SYRINGE (ML) INJECTION AS NEEDED
Status: DISCONTINUED | OUTPATIENT
Start: 2018-12-04 | End: 2018-12-07 | Stop reason: HOSPADM

## 2018-12-04 RX ORDER — ZOLPIDEM TARTRATE 5 MG/1
5 TABLET ORAL
Status: DISCONTINUED | OUTPATIENT
Start: 2018-12-04 | End: 2018-12-07 | Stop reason: HOSPADM

## 2018-12-04 RX ORDER — SODIUM CHLORIDE 0.9 % (FLUSH) 0.9 %
5-10 SYRINGE (ML) INJECTION EVERY 8 HOURS
Status: DISCONTINUED | OUTPATIENT
Start: 2018-12-04 | End: 2018-12-07 | Stop reason: HOSPADM

## 2018-12-04 RX ORDER — OXYTOCIN/0.9 % SODIUM CHLORIDE 30/500 ML
0-20 PLASTIC BAG, INJECTION (ML) INTRAVENOUS
Status: DISCONTINUED | OUTPATIENT
Start: 2018-12-04 | End: 2018-12-04

## 2018-12-04 RX ADMIN — ZOLPIDEM TARTRATE 5 MG: 5 TABLET ORAL at 20:22

## 2018-12-04 RX ADMIN — ANTACID TABLETS 400 MG: 500 TABLET, CHEWABLE ORAL at 22:00

## 2018-12-04 NOTE — PROGRESS NOTES
[facility]  OFFICE PROCEDURE PROGRESS NOTE        Chart reviewed for the following:   Emperatriz Fischer LPN, have reviewed the History, Physical and updated the Allergic reactions for [address] performed immediately prior to start of procedure:   Emperatriz Fischer LPN, have performed the following reviews on Dirk Avalos prior to the start of the procedure:            * Patient was identified by name and date of birth   * Agreement on procedure being performed was verified  * Risks and Benefits explained to the patient  * Procedure site verified and marked as necessary  * Patient was positioned for comfort  * Consent was signed and verified     Time: 1210      Date of procedure: 2018    Procedure performed by: Lakeisha Martinez MD    Provider assisted by: Tracy Cooper. Sue CHENG    Patient assisted by: self    How tolerated by patient: tolerated the procedure well with no complications    Post Procedural Pain Scale: 2 - Hurts Little Bit    Comments: none          Cervical Wallace insertion procedure note    Dirk Avalos is a ,  43 y.o. female who presents today far placement of a wallace catheter in the cervix for ripening. Her cervix is unfavorable and she is scheduled for induction tomorrow. She has elected to have a cervical wallace catheter placement today. The risks, benefits and assets of the procedure were discussed. Her questions were answered. PROCEDURE: The vagina and cervix was prepped with Zephiran. A Wallace catheter was placed through the cervix without difficulty. The bulb was inflated with 35 cc of saline. Bleeding was minimal. The patient's level of discomfort was minimal.   POST PROCEDURE: The patient tolerated the procedure well. There were no complications. She was admitted as an outpatient for monitoring overnight. She was given labor and ROM warnings.

## 2018-12-04 NOTE — H&P
History & Physical 
 
Name: Trell Mojica MRN: [id number]  SSN: [id number] YOB: 1980  Age: 43 y.o. Sex: female Subjective:  
 
Estimated Date of Delivery: 18 OB History  Para Term  AB Living 3 1 1   1 1 SAB TAB Ectopic Molar Multiple Live Births 1         1 Ms. Sourav Lincoln is admitted with pregnancy at 39w4d for induction of labor. Prenatal course was complicated by AMA, smoking, gall bladder pain from gall stone, anemia, and HSV. Her induction is elective. Group B Strep was negative. Past Medical History:  
Diagnosis Date  Gallstones  Herpes  High cholesterol  Meningitis  Sleep apnea Past Surgical History:  
Procedure Laterality Date  HX DILATION AND CURETTAGE    
 age 23  
 HX ORTHOPAEDIC    
 right leg tissue removal  
 
Social History Occupational History  Not on file Tobacco Use  Smoking status: Current Every Day Smoker Years: 20.00 Types: Cigarettes  Smokeless tobacco: Never Used Substance and Sexual Activity  Alcohol use: No  
 Drug use: No  
 Sexual activity: Yes  
  Partners: Male Birth control/protection: None Family History Problem Relation Age of Onset  Hypertension Mother  Diabetes Mother  Breast Cancer Maternal Grandmother  Hypertension Maternal Grandmother  Ovarian Cancer Maternal Grandmother  Kidney Disease Maternal Grandmother  Diabetes Maternal Uncle Allergies Allergen Reactions  Aspirin Nausea and Vomiting  Pcn [Penicillins] Other (comments)  Tylenol [Acetaminophen] Nausea Only and Vertigo Prior to Admission medications Medication Sig Start Date End Date Taking? Authorizing Provider PNV [id number] fum/folic ac (PRENATAL PO) Take  by mouth. Provider, Historical  
ferrous sulfate ER (IRON) 160 mg (50 mg iron) TbER tablet Take 1 Tab by mouth daily.     Provider, Historical  
 OTHER bcp    Zuri Hale MD  
omeprazole (PRILOSEC) 20 mg capsule Take 20 mg by mouth as needed. Zuri Hale MD  
cpap machine kit by Does Not Apply route. Zuri Hale MD  
  
 
Review of Systems: A comprehensive review of systems was negative except for that written in the HPI. Objective:  
 
Vitals: There were no vitals filed for this visit. Physical Exam: Abdomen: soft, nontender Fundus: soft and non tender Perineum: blood absent, amniotic fluid absent Cervical Exam: 1 cm dilated 30% effaced   
-3 station Presenting Part: cephalic Cervical Position: mid position Consistency: very soft Lower Extremities:  - Edema 1+ Membranes:  Intact Fetal Heart Rate: pending Prenatal Labs:  
Lab Results Component Value Date/Time  
 Rubella, External immune 06/01/2018 GrBStrep, External Negative 10/31/2018 HBsAg, External negative 06/01/2018 HIV, External negative 06/01/2018 Gonorrhea, External negative 06/01/2018 Chlamydia, External negative 06/01/2018 Assessment/Plan:  
 
Plan: ARM and induction of labor. Cx wallace placed today. Signed By:  Sol Turner MD   
 December 4, 2018

## 2018-12-04 NOTE — PROGRESS NOTES
1250 Patient arrived for elective induction. Patient oriented to room. Plan of care discussed with patient. 1905 wallace bulb out 
1905 Bedside and Verbal shift change report given to GRACE Hughes RN (oncoming nurse) by Mari Diaz RN, Femi Baez RN (offgoing nurse). Report included the following information SBAR, Kardex, Procedure Summary, Recent Results and Med Rec Status.

## 2018-12-05 ENCOUNTER — ANESTHESIA (OUTPATIENT)
Dept: LABOR AND DELIVERY | Age: 38
DRG: 560 | End: 2018-12-05
Payer: MEDICAID

## 2018-12-05 ENCOUNTER — ANESTHESIA EVENT (OUTPATIENT)
Dept: LABOR AND DELIVERY | Age: 38
DRG: 560 | End: 2018-12-05
Payer: MEDICAID

## 2018-12-05 PROCEDURE — 74011250637 HC RX REV CODE- 250/637: Performed by: OBSTETRICS & GYNECOLOGY

## 2018-12-05 PROCEDURE — 75410000002 HC LABOR FEE PER 1 HR: Performed by: OBSTETRICS & GYNECOLOGY

## 2018-12-05 PROCEDURE — 74011000250 HC RX REV CODE- 250

## 2018-12-05 PROCEDURE — 77030010848 HC CATH INTUTR PRSS KOLB -B

## 2018-12-05 PROCEDURE — 74011250636 HC RX REV CODE- 250/636: Performed by: OBSTETRICS & GYNECOLOGY

## 2018-12-05 PROCEDURE — 3E0R3BZ INTRODUCTION OF ANESTHETIC AGENT INTO SPINAL CANAL, PERCUTANEOUS APPROACH: ICD-10-PCS | Performed by: ANESTHESIOLOGY

## 2018-12-05 PROCEDURE — 77030014125 HC TY EPDRL BBMI -B: Performed by: ANESTHESIOLOGY

## 2018-12-05 PROCEDURE — 00HU33Z INSERTION OF INFUSION DEVICE INTO SPINAL CANAL, PERCUTANEOUS APPROACH: ICD-10-PCS | Performed by: ANESTHESIOLOGY

## 2018-12-05 PROCEDURE — 77030034850

## 2018-12-05 PROCEDURE — 74011250636 HC RX REV CODE- 250/636: Performed by: ANESTHESIOLOGY

## 2018-12-05 PROCEDURE — 65270000029 HC RM PRIVATE

## 2018-12-05 PROCEDURE — 74011000250 HC RX REV CODE- 250: Performed by: OBSTETRICS & GYNECOLOGY

## 2018-12-05 RX ORDER — FAMOTIDINE 10 MG/ML
INJECTION INTRAVENOUS
Status: DISPENSED
Start: 2018-12-05 | End: 2018-12-05

## 2018-12-05 RX ORDER — ONDANSETRON 4 MG/1
8 TABLET, ORALLY DISINTEGRATING ORAL
Status: DISCONTINUED | OUTPATIENT
Start: 2018-12-05 | End: 2018-12-07 | Stop reason: HOSPADM

## 2018-12-05 RX ORDER — BUPIVACAINE HYDROCHLORIDE 2.5 MG/ML
INJECTION, SOLUTION EPIDURAL; INFILTRATION; INTRACAUDAL AS NEEDED
Status: DISCONTINUED | OUTPATIENT
Start: 2018-12-05 | End: 2018-12-05 | Stop reason: HOSPADM

## 2018-12-05 RX ORDER — METHYLERGONOVINE MALEATE 0.2 MG/ML
0.2 INJECTION INTRAVENOUS ONCE
Status: ACTIVE | OUTPATIENT
Start: 2018-12-05 | End: 2018-12-06

## 2018-12-05 RX ORDER — LIDOCAINE HYDROCHLORIDE AND EPINEPHRINE 15; 5 MG/ML; UG/ML
INJECTION, SOLUTION EPIDURAL AS NEEDED
Status: DISCONTINUED | OUTPATIENT
Start: 2018-12-05 | End: 2018-12-05 | Stop reason: HOSPADM

## 2018-12-05 RX ORDER — ZOLPIDEM TARTRATE 5 MG/1
5 TABLET ORAL
Status: DISCONTINUED | OUTPATIENT
Start: 2018-12-05 | End: 2018-12-05

## 2018-12-05 RX ORDER — BISACODYL 5 MG
5 TABLET, DELAYED RELEASE (ENTERIC COATED) ORAL DAILY PRN
Status: DISCONTINUED | OUTPATIENT
Start: 2018-12-05 | End: 2018-12-07 | Stop reason: HOSPADM

## 2018-12-05 RX ORDER — HYDROCORTISONE ACETATE PRAMOXINE HCL 2.5; 1 G/100G; G/100G
CREAM TOPICAL AS NEEDED
Status: DISCONTINUED | OUTPATIENT
Start: 2018-12-05 | End: 2018-12-07 | Stop reason: HOSPADM

## 2018-12-05 RX ORDER — SODIUM CHLORIDE 0.9 % (FLUSH) 0.9 %
5-10 SYRINGE (ML) INJECTION EVERY 8 HOURS
Status: DISCONTINUED | OUTPATIENT
Start: 2018-12-05 | End: 2018-12-06 | Stop reason: HOSPADM

## 2018-12-05 RX ORDER — IBUPROFEN 800 MG/1
800 TABLET ORAL EVERY 8 HOURS
Status: DISCONTINUED | OUTPATIENT
Start: 2018-12-05 | End: 2018-12-07 | Stop reason: HOSPADM

## 2018-12-05 RX ORDER — EPHEDRINE SULFATE 50 MG/ML
10 INJECTION, SOLUTION INTRAVENOUS
Status: DISCONTINUED | OUTPATIENT
Start: 2018-12-05 | End: 2018-12-06 | Stop reason: HOSPADM

## 2018-12-05 RX ORDER — DIPHENHYDRAMINE HCL 25 MG
25 CAPSULE ORAL
Status: DISCONTINUED | OUTPATIENT
Start: 2018-12-05 | End: 2018-12-07 | Stop reason: HOSPADM

## 2018-12-05 RX ORDER — HYDROMORPHONE HYDROCHLORIDE 2 MG/ML
1 INJECTION, SOLUTION INTRAMUSCULAR; INTRAVENOUS; SUBCUTANEOUS
Status: DISCONTINUED | OUTPATIENT
Start: 2018-12-05 | End: 2018-12-07 | Stop reason: HOSPADM

## 2018-12-05 RX ORDER — OXYTOCIN/RINGER'S LACTATE 20/1000 ML
125-500 PLASTIC BAG, INJECTION (ML) INTRAVENOUS ONCE
Status: CANCELLED | OUTPATIENT
Start: 2018-12-05 | End: 2018-12-05

## 2018-12-05 RX ORDER — SIMETHICONE 80 MG
80 TABLET,CHEWABLE ORAL
Status: DISCONTINUED | OUTPATIENT
Start: 2018-12-05 | End: 2018-12-07 | Stop reason: HOSPADM

## 2018-12-05 RX ORDER — FENTANYL/BUPIVACAINE/NS/PF 2-1250MCG
1-16 PREFILLED PUMP RESERVOIR EPIDURAL CONTINUOUS
Status: DISCONTINUED | OUTPATIENT
Start: 2018-12-05 | End: 2018-12-06 | Stop reason: HOSPADM

## 2018-12-05 RX ORDER — HYDROCODONE BITARTRATE AND ACETAMINOPHEN 10; 325 MG/1; MG/1
1 TABLET ORAL
Status: DISCONTINUED | OUTPATIENT
Start: 2018-12-05 | End: 2018-12-07 | Stop reason: HOSPADM

## 2018-12-05 RX ORDER — LIDOCAINE HYDROCHLORIDE AND EPINEPHRINE 15; 5 MG/ML; UG/ML
INJECTION, SOLUTION EPIDURAL
Status: DISCONTINUED | OUTPATIENT
Start: 2018-12-05 | End: 2018-12-05 | Stop reason: HOSPADM

## 2018-12-05 RX ORDER — NALOXONE HYDROCHLORIDE 0.4 MG/ML
0.4 INJECTION, SOLUTION INTRAMUSCULAR; INTRAVENOUS; SUBCUTANEOUS AS NEEDED
Status: DISCONTINUED | OUTPATIENT
Start: 2018-12-05 | End: 2018-12-07 | Stop reason: HOSPADM

## 2018-12-05 RX ORDER — SODIUM CHLORIDE 0.9 % (FLUSH) 0.9 %
5-10 SYRINGE (ML) INJECTION AS NEEDED
Status: DISCONTINUED | OUTPATIENT
Start: 2018-12-05 | End: 2018-12-06 | Stop reason: HOSPADM

## 2018-12-05 RX ORDER — NALBUPHINE HYDROCHLORIDE 10 MG/ML
2.5 INJECTION, SOLUTION INTRAMUSCULAR; INTRAVENOUS; SUBCUTANEOUS
Status: ACTIVE | OUTPATIENT
Start: 2018-12-05 | End: 2018-12-05

## 2018-12-05 RX ADMIN — OXYTOCIN-SODIUM CHLORIDE 0.9% IV SOLN 30 UNIT/500ML 2 MILLI-UNITS/MIN: 30-0.9/5 SOLUTION at 04:58

## 2018-12-05 RX ADMIN — FAMOTIDINE 20 MG: 10 INJECTION, SOLUTION INTRAVENOUS at 05:00

## 2018-12-05 RX ADMIN — SODIUM CHLORIDE, SODIUM LACTATE, POTASSIUM CHLORIDE, AND CALCIUM CHLORIDE 125 ML/HR: 600; 310; 30; 20 INJECTION, SOLUTION INTRAVENOUS at 04:58

## 2018-12-05 RX ADMIN — Medication 12 ML/HR: at 10:15

## 2018-12-05 RX ADMIN — SODIUM CHLORIDE, SODIUM LACTATE, POTASSIUM CHLORIDE, AND CALCIUM CHLORIDE 1000 ML: 600; 310; 30; 20 INJECTION, SOLUTION INTRAVENOUS at 09:22

## 2018-12-05 RX ADMIN — LIDOCAINE HYDROCHLORIDE AND EPINEPHRINE 3 ML: 15; 5 INJECTION, SOLUTION EPIDURAL at 09:40

## 2018-12-05 RX ADMIN — IBUPROFEN 800 MG: 800 TABLET ORAL at 16:53

## 2018-12-05 RX ADMIN — SODIUM CHLORIDE, SODIUM LACTATE, POTASSIUM CHLORIDE, AND CALCIUM CHLORIDE 1000 ML: 600; 310; 30; 20 INJECTION, SOLUTION INTRAVENOUS at 08:22

## 2018-12-05 RX ADMIN — SODIUM CHLORIDE, SODIUM LACTATE, POTASSIUM CHLORIDE, AND CALCIUM CHLORIDE 125 ML/HR: 600; 310; 30; 20 INJECTION, SOLUTION INTRAVENOUS at 11:11

## 2018-12-05 RX ADMIN — BUPIVACAINE HYDROCHLORIDE 1 ML: 2.5 INJECTION, SOLUTION EPIDURAL; INFILTRATION; INTRACAUDAL at 09:40

## 2018-12-05 RX ADMIN — OXYTOCIN-SODIUM CHLORIDE 0.9% IV SOLN 30 UNIT/500ML 8 MILLI-UNITS/MIN: 30-0.9/5 SOLUTION at 12:18

## 2018-12-05 NOTE — PROGRESS NOTES
1900- Bedside report received. Bills bulb out at this time. Remains on monitor for NST. 0355- Pt up to shower at this time. 0430- Placed on monitor for NST prior to starting pitocin. 0500- Pitocin started per order. Pt medicated with IV Pepcid for heartburn. 9534- Bedside and Verbal shift change report given to Lina Fernandes (oncoming nurse) by Manuela Narvaez (offgoing nurse). Report included the following information SBAR.

## 2018-12-05 NOTE — L&D DELIVERY NOTE
Delivery Summary    Patient: June Boss MRN: [id number]  SSN: [id number]    YOB: 1980  Age: 43 y.o. Sex: female       Information for the patient's :  Maddie Polanco, Female [[id number]]       Labor Events:    Labor: No   Rupture Date: 2018   Rupture Time: 7:26 AM   Rupture Type AROM   Amniotic Fluid Volume: Polyhydramic    Amniotic Fluid Description: Clear None   Induction: AROM; Oxytocin       Augmentation: None   Labor Events:       Cervical Ripenin2018 2:12 PM Bills/EASI     Delivery Events:  Episiotomy: None   Laceration(s): None     Repaired: None    Number of Repair Packets:     Suture Type and Size: None     Estimated Blood Loss (ml): 250ml       Delivery Date: 2018    Delivery Time: 3:57 PM  Delivery Type: Vaginal, Spontaneous  Sex:  Female     Gestational Age: 38w11d   Delivery Clinician:  Ashely Leyva  Living Status: Living   Delivery Location: L&D            APGARS  One minute Five minutes Ten minutes   Skin color: 1   1        Heart rate: 2   2        Grimace: 2   2        Muscle tone: 2   2        Breathin   2        Totals: 9   9            Presentation: Vertex    Position: Left Occiput Anterior  Resuscitation Method:  Tactile Stimulation;Suctioning-bulb     Meconium Stained: None      Cord Information: 3 Vessels  Complications: None  Cord around:    Delayed cord clamping? Yes  Cord clamped date/time:   Disposition of Cord Blood: Discard    Blood Gases Sent?: No    Placenta:  Date/Time: 2018  4:00 PM  Removal: Spontaneous      Appearance: Normal//Intact/Uterus explored and negative      Measurements:  Birth Weight:        Birth Length:        Head Circumference:        Chest Circumference:       Abdominal Girth:       Other Providers:   WILMS, ISCA ROCHELLE;JOEL OWEN, Obstetrician;Primary Nurse;Primary  Nurse           Group B Strep:   Lab Results   Component Value Date/Time    GrBStrep, External Negative 10/31/2018     Information for the patient's :  Michelle Negro, Female [[id number]]   No results found for: ABORH, PCTABR, PCTDIG, BILI, ABORHEXT, ABORH    No results for input(s): PCO2CB, PO2CB, HCO3I, SO2I, IBD, PTEMPI, SPECTI, PHICB, ISITE, IDEV, IALLEN in the last 72 hours.

## 2018-12-05 NOTE — PROGRESS NOTES
7600 [facility] Dr Yan Every in room, aware of difficulty recording fhr and reviewed EFM strip. IUPC and FSE explaced to pt and agrees 1143 IUPC inserted by Dr Yan Every 1144 FSE placed by Dr Yan Every. tolerasted well

## 2018-12-05 NOTE — ANESTHESIA PROCEDURE NOTES
CSE Block Start time: 12/5/2018 9:40 AM 
End time: 12/5/2018 9:59 AM 
Performed by: Gogo Martel MD 
Authorized by: Gogo Martel MD  
 
Pre-Procedure Procedure:  
Prep Region:  Lumbar Prep: chlorhexidine Location:  L2-3 Epidural Needle:  
Needle Type:  Mine Feeling Needle Gauge:  18 G Attempts:  1 Spinal Needle:  
Needle Gauge:  27 G Catheter:  
Catheter Type:  Standard Catheter Size:  19 G Catheter at Skin Depth (cm):  11 Depth in Epidural Space (cm):  7 Events: no blood with aspiration, no paresthesia, negative aspiration test and CSF confirmed Assessment:  
Catheter Secured:  Tegaderm and tape Insertion:  Uncomplicated

## 2018-12-05 NOTE — PROGRESS NOTES
1:11 PM Bedside and Verbal shift change report given to NAMAN Claudio RN (oncoming nurse) by Luly Gil RN (offgoing nurse). Report included the following information SBAR, Kardex and [facility] . 1:19 PM Anesthesia at the bedside to re check on the pts comfort level  Aware that pt remains uncomfortable. 1:28 PM Pt moaning in discomfort after the re dose. Dr Fransisco Cooper called and message left on his vocera to come and re evaluate the pt. 
1340 Anesthesia at the bedside to replace her epidural.  Time out complete. Alexx 73 Epidural cath placed test dose given [address] Epidural complete pt sitting in high fowlers with the lower bed down. 2:20 PM IUPC catheter flushed with 30cc NS 
2:31 PM Dr Fiona Kirby updated on pt and her progress [address] [facility] [address] Samuel Mason at the bedside to check the pt. SVE 9/100/-1 baby is direct OP. Dr Fiona Kirby attempted to rotate manually without success. Pt's underpad chnaged pt repositioned to her left side with right leg up in stirrups 3:45 PM Right leg taken out of stirrup. Peanut ball applied between her legs 700 Felipe Pt c/o of increase in rectal pressure SVE 10/100/+3  Dr Fiona Kirby called for delivery. 65 Bills cath removed. Pt up in stirrups. Dr Samuel Mason at the bedside FSE and IUPC removed [address]. Delivery of LFC strong cry 
1558 Baby skin to skin with the pt 
33 64 74 Ana Skillern lactation at the bedside to help with breastfeeding 4:59 PM Pts family at the bedside. [address] Pt up OOB to the BR voided 450cc of clear yellow urine mixed with lochia. Went over danika care with the pt. Under wear and pad applied. Epidural cath removed tip int acted and blue.

## 2018-12-05 NOTE — LACTATION NOTE
[facility] Lactation Consultant Progress Notes Signed Date of Service:  12/05/18 3535 Problem: Lactation Care Plan Goal: *Infant latching appropriately Outcome: Progressing Towards Goal 
Pt will successfully establish breastfeeding by feeding in response to infant's early feeding cues and/or to offer breast every 2-3 hours. Ways to obtain a deep latch and seek comfortable positioning shared, aware to keep log of feedings/output. Goal: *Weight loss less than 10% of birth weight Outcome: Progressing Towards Goal 
  
Encouraged mom to attempt feeding with baby led feeding cues. Just as sucking on fingers, rooting, mouthing. Looking for 8-12 feedings in 24 hours. Don't limit baby at breast, allow baby to come of breast on it's own. Baby may want to feed  often and may increase number of feedings on second day of life. Skin to skin encouraged.  
  
 If baby doesn't nurse,  Mom should  hand express  10-20 drops of colostrum and drip into baby's mouth, or give to baby by finger feeding, cup feeding, or spoon feeding at least every 2-3 hours.  
  
Problem: Patient Education: Go to Patient Education Activity Goal: Patient/Family Education Outcome: Progressing Towards Goal 
Discussed with mother her plan for feeding. Reviewed the benefits of exclusive breast milk feeding during the hospital stay. Informed her of the risks of using formula to supplement in the first few days of life as well as the benefits of successful breast milk feeding; referred her to the Breastfeeding booklet about this information. She acknowledges understanding of information reviewed and states that it is her plan to breastfeed her infant.   Will support her choice and offer additional information as needed.  
  
Comments: Pt will successfully establish breastfeeding by feeding in response to early feeding cues  
or wake every 3h, will obtain deep latch, and will keep log of feedings/output. Taught to BF at hunger cues and or q 2-3 hrs and to offer 10-20 drops of hand expressed colostrum at any non-feeds.   
  
Breast Assessment Left Breast: Medium Left Nipple: Everted, Intact Right Breast: Medium Right Nipple: Everted, Intact Breast- Feeding Assessment Attends Breast-Feeding Classes: Yes([facility] class) Breast-Feeding Experience: Yes(x a few months with first baby) Breast Trauma/Surgery: No 
Type/Quality: Good Lactation Consultant Visits Breast-Feedings: Good (LC present for baby's first feeding - baby latched on well to left breast and nursed vigorously) Mother/Infant Observation Mother Observation: Alignment, Cramps, Sleepy after feeding, Nipple round on release, Gush lochia, Close hold, Holds breast 
Infant Observation: Audible swallows, Latches nipple and aereolae, Lips flanged, lower, Rhythmic suck, Feeding cues, Lips flanged, upper, Opens mouth LATCH Documentation Latch: Grasps breast, tongue down, lips flanged, rhythmic sucking Audible Swallowing: A few with stimulation Type of Nipple: Everted (after stimulation) Comfort (Breast/Nipple): Soft/non-tender Hold (Positioning): No assist from staff, mother able to position/hold infant LATCH Score: 9

## 2018-12-05 NOTE — PROGRESS NOTES
Ob Progress Note Subjective: no complaints Objective:  
Patient Vitals for the past 4 hrs: 
 Temp Pulse Resp BP SpO2  
12/05/18 0902 98.4 °F (36.9 °C)  16    
12/05/18 0901     100 % 12/05/18 0856     100 % 12/05/18 0851     99 % 12/05/18 0846     100 % 12/05/18 0841     99 % 12/05/18 0840  82  152/74   
12/05/18 0836     100 % 12/05/18 0831     100 % 12/05/18 0826     100 % 12/05/18 0821     100 % 12/05/18 0816     100 % 12/05/18 0811     100 % 12/05/18 0808  73  136/76   
12/05/18 0806     100 % 12/05/18 0801     100 % 12/05/18 0756     100 % 12/05/18 0751     99 % 12/05/18 0746     100 % 12/05/18 0741     100 % 12/05/18 0739  74  143/80   
12/05/18 0736     100 % 12/05/18 0731     100 % 12/05/18 0726     100 % 12/05/18 0721     100 % 12/05/18 0716     100 % 12/05/18 0713  81  156/76   
12/05/18 0711     99 % 12/05/18 0706     99 % Cervical Exam 
Dilation (cm): 5 Eff: 90 % Station: -2 Vaginal exam done by? : Dr Riri El Baby Position: Vertex(confirmed bty US at bedside by Dr Riri El) Presentation: Cephalic Membrane Status: AROM(bedside US by Dr Stephen El) FHT's category 1. Contractions: moderate  every 5 minutes lasting 45 seconds. Assessment/Plan: 
AROM with pudendal needle after confirming cephalic presentation by US. FHT's category one after large amount of fluid released.

## 2018-12-05 NOTE — ANESTHESIA PREPROCEDURE EVALUATION
Anesthetic History No history of anesthetic complications Review of Systems / Medical History Patient summary reviewed Pulmonary Sleep apnea: CPAP Neuro/Psych Within defined limits Cardiovascular Exercise tolerance: >4 METS 
  
GI/Hepatic/Renal 
Within defined limits Endo/Other Within defined limits Other Findings Physical Exam 
 
Airway Mallampati: III Mouth opening: Normal 
 
 Cardiovascular Rhythm: regular Rate: normal 
 
 
 
 Dental 
No notable dental hx Pulmonary Breath sounds clear to auscultation Abdominal 
GI exam deferred Other Findings Anesthetic Plan ASA: 3 Anesthesia type: epidural 
 
 
 
 
 
Anesthetic plan and risks discussed with: Patient

## 2018-12-06 PROCEDURE — 65270000029 HC RM PRIVATE

## 2018-12-06 PROCEDURE — 74011250637 HC RX REV CODE- 250/637: Performed by: OBSTETRICS & GYNECOLOGY

## 2018-12-06 PROCEDURE — 74011250636 HC RX REV CODE- 250/636: Performed by: OBSTETRICS & GYNECOLOGY

## 2018-12-06 RX ADMIN — ANTACID TABLETS 400 MG: 500 TABLET, CHEWABLE ORAL at 19:37

## 2018-12-06 RX ADMIN — IBUPROFEN 800 MG: 800 TABLET ORAL at 10:08

## 2018-12-06 RX ADMIN — IBUPROFEN 800 MG: 800 TABLET ORAL at 01:09

## 2018-12-06 RX ADMIN — IBUPROFEN 800 MG: 800 TABLET ORAL at 19:37

## 2018-12-06 RX ADMIN — FAMOTIDINE 20 MG: 10 INJECTION, SOLUTION INTRAVENOUS at 01:17

## 2018-12-06 NOTE — PROGRESS NOTES
Post-Partum Day Number 1 Progress note post vaginal delivery Pt has no unusual postpartum complaints. Pain is well controlled with current medications. The baby is doing well. Urinary output is adequate. Voiding without difficulty. The patient is ambulating well. Tolerating a regular diet. Vitals:   
Patient Vitals for the past 8 hrs: 
 BP Temp Pulse Resp  
18 0530 142/82 98.2 °F (36.8 °C) 79 18  
18 0449 133/79 97.8 °F (36.6 °C) 80 16 Temp (24hrs), Av °F (36.7 °C), Min:97.5 °F (36.4 °C), Max:98.4 °F (36.9 °C) Exam:  Patient without distress. Abdomen soft, fundus firm,  nontender Perineum with normal lochia noted. Lower extremities are negative for swelling, cords or tenderness. Labs: No results found for this or any previous visit (from the past 24 hour(s)). Assessment:  
 
Status post vaginal delivery. Doing well postpartum day 1. Plan:  
 
Routine postpartum care

## 2018-12-06 NOTE — PROGRESS NOTES
Bedside and Verbal shift change report given to Sean Mancera (oncoming nurse) by MAX Barth RN (offgoing nurse). Report included the following information SBAR, Kardex, Intake/Output and MAR.

## 2018-12-06 NOTE — LACTATION NOTE
This note was copied from a baby's chart. Attempted to see mother this am however mother was sleeping. Mother resting in bed. Mother states she hasn't slept that much over last 3 days and is really tired. Mother states she is doing mostly formula at this time so she can rest.  Family holding baby. Briefly reviewed BF basics, will need reinforcement of info. Discussed with mother her plan for feeding. Reviewed the benefits of exclusive breast milk feeding during the hospital stay. Informed her of the risks of using formula to supplement in the first few days of life as well as the benefits of successful breast milk feeding; referred her to the Breastfeeding booklet about this information. She acknowledges understanding of information reviewed and states that it is her plan to breast and formula feed her infant. Will support her choice and offer additional information as needed. Pt chooses to do both breast and bottle feeding, will follow recommendations to breastfeed first to help establish milk supply and only top off with formula, if needed, will be educated on potential consequences of early supplementation on breastfeeding success, but will be supported in decision to do both. Discussed with mother her plan for feeding. Reviewed the benefits of exclusive breast milk feeding during the hospital stay. Pt will successfully establish breastfeeding by feeding in response to early feeding cues  
or wake every 3h, will obtain deep latch, and will keep log of feedings/output. Taught to BF at hunger cues and or q 2-3 hrs and to offer 10-20 drops of hand expressed colostrum at any non-feeds. Breast Assessment Left Breast: Medium Left Nipple: Everted, Intact Right Breast: Medium Right Nipple: Everted, Intact Breast- Feeding Assessment Attends Breast-Feeding Classes: No 
Breast-Feeding Experience: Yes(2 months blended feeding with 1st) Breast Trauma/Surgery: No 
 Type/Quality: Good(per mom) Lactation Consultant Visits Breast-Feedings: Good (per mother, offering breast then following with formula) Mother/Infant Observation Mother Observation: Breast comfortable Infant Observation: Audible swallows, Latches nipple and aereolae, Lips flanged, lower, Rhythmic suck, Feeding cues, Lips flanged, upper, Opens mouth

## 2018-12-06 NOTE — PROGRESS NOTES
1900 Assumed care of pt at this time from [address] [place] [address], Soheila Pickard, RN. 
 
 
1668 TRANSFER - OUT REPORT: 
 
Verbal report given to MAX Barth RN(name) on Kerry Membreno  being transferred to MIU(unit) for routine progression of care Report consisted of patients Situation, Background, Assessment and  
Recommendations(SBAR). Information from the following report(s) SBAR, Kardex, Intake/Output, MAR, Recent Results and Med Rec Status was reviewed with the receiving nurse. Lines:    
 
Opportunity for questions and clarification was provided. Patient transported with: 
 Registered Nurse

## 2018-12-06 NOTE — PROGRESS NOTES
12/06/18 3:17 PM 
CM met with COLIN to complete initial assessment and to begin discharge planning. COLIN was present with her 12year old daughter, who also has the 4th band. Demographics were reviewed and confirmed; COLIN rents a room at her parent's home in [place]. FOB for this pregnancy has not been involved since COLIN found out she was pregnant. We discussed supports, which include COLIN's parents, her older daughter's father, COLIN's sister Christopher Teixeira, and other friends in the area. COLIN works as a  at [employer]; she will return to work at the end of January. She is breast and bottlefeeding formula. [facility]) will provide medical follow up for the baby. Patient has car seat, crib, clothing, and other necessary supplies. She has [facility] services through [facility] at the [facility] location; CM encouraged COLIN to call by tomorrow to set up an enrollment appointment for the baby. COLIN also has Medicaid services (ID #[id number]); however, issue with this being verified. CM contacted [employer] rep; per [place] failed to update Medicaid plan from Plan first to pregnancy Medicaid in their system, therefore it is not showing active when we look. [employer] working with DSS , Ines Moore, to fix issue and add baby. COLIN's mother will provide transportation home at discharge tomorrow. Care Management Interventions PCP Verified by CM: Yes(health department, provided with [facility] PCP list) Mode of Transport at Discharge: Self Transition of Care Consult (CM Consult): Discharge Planning Current Support Network: Relative's Home Confirm Follow Up Transport: Family Plan discussed with Pt/Family/Caregiver: Yes Discharge Location Discharge Placement: Home with family assistance DAIJA Emmanuel

## 2018-12-07 VITALS
TEMPERATURE: 98.4 F | RESPIRATION RATE: 16 BRPM | HEART RATE: 87 BPM | OXYGEN SATURATION: 100 % | SYSTOLIC BLOOD PRESSURE: 130 MMHG | DIASTOLIC BLOOD PRESSURE: 70 MMHG

## 2018-12-07 PROCEDURE — 74011250637 HC RX REV CODE- 250/637: Performed by: OBSTETRICS & GYNECOLOGY

## 2018-12-07 RX ADMIN — IBUPROFEN 800 MG: 800 TABLET ORAL at 06:22

## 2018-12-07 NOTE — PROGRESS NOTES
Post-Partum Day Number 2 Progress note post vaginal delivery Patient doing well post-partum without significant complaint. Voiding without difficulty, normal lochia, positive flatus. Vitals:   
Patient Vitals for the past 8 hrs: 
 BP Temp Pulse Resp 18 0605 130/70 98.4 °F (36.9 °C) 87 16 Temp (24hrs), Av °F (36.7 °C), Min:97.3 °F (36.3 °C), Max:98.4 °F (36.9 °C) Vital signs stable, afebrile. Exam:  Patient without distress. Abdomen soft, fundus firm,  nontender Perineum with normal lochia noted. Lower extremities are negative for swelling, cords or tenderness. Labs: No results found for this or any previous visit (from the past 24 hour(s)). Assessment:  
 
Status post: vaginal delivery. Doing well postpartum day 2. Plan:  
 
Routine postpartum care. Plan discharge for today with follow up in our office in 6 weeks. See discharge summary.

## 2018-12-07 NOTE — PROGRESS NOTES
Bedside and Verbal shift change report given to MERNA Mallory RN 
 (oncoming nurse) by MAX Barth RN (offgoing nurse). Report included the following information SBAR, Kardex, Intake/Output and MAR.

## 2018-12-07 NOTE — PROGRESS NOTES
Bedside and Verbal shift change report given to Ashley Ríos RN (oncoming nurse) by Romel Drew RNC (offgoing nurse). Report included the following information SBAR, Kardex, Intake/Output, MAR and Recent Results.

## 2018-12-07 NOTE — DISCHARGE SUMMARY
Obstetrical Discharge Summary     Name: Rodrigo Falcon MRN: [id number]  SSN: [id number]    YOB: 1980  Age: 43 y.o. Sex: female      Admit Date: 2018    Discharge Date: 2018     Admitting Physician: Sourav Wolf MD     Attending Physician:  Nehal Small MD     Admission Diagnoses: Pregnancy [Z34.90]    Discharge Diagnoses:   Information for the patient's :  Kyrie Lay, Female [[id number]]   Delivery of a 8 lb 11.2 oz (3.945 kg) female infant via Vaginal, Spontaneous on 2018 at 3:57 PM  by . Apgars were 9 and 9. Additional Diagnoses:   Hospital Problems  Date Reviewed: 10/17/2018          Codes Class Noted POA    Pregnancy ICD-10-CM: Z34.90  ICD-9-CM: V22.2  2018 Unknown        Labor and delivery, indication for care ICD-10-CM: O75.9  ICD-9-CM: 659.90  2018 Unknown             Lab Results   Component Value Date/Time    Rubella, External immune 2018    GrBStrep, External Negative 10/31/2018       Immunization(s):   Immunization History   Administered Date(s) Administered    Influenza Vaccine (Quad) PF 10/03/2018    Tdap 2018        Hospital Course: Normal hospital course following the delivery. The patient was released to her home in good condition. Patient Instructions:     Reference my discharge instructions.       Follow-up Appointments   Procedures    FOLLOW UP VISIT Appointment in: 6 Weeks     Standing Status:   Standing     Number of Occurrences:   1     Order Specific Question:   Appointment in     Answer:   6 Weeks        Signed By:  Sourav Wolf MD     2018

## 2018-12-07 NOTE — DISCHARGE INSTRUCTIONS
POST VAGINAL DELIVERY DISCHARGE INSTRUCTIONS    Name: June Boss  YOB: 1980  Primary Diagnosis: Active Problems:    Pregnancy (12/4/2018)      Labor and delivery, indication for care (12/4/2018)        General:     Diet/Diet Restrictions:  Drink plenty of fluids daily (water, juices); avoid excessive caffeine intake. Eat and drink your normal diet. Medications:   See list    Physical Activity / Restrictions / Safety:     Avoid heavy lifting, no more that 15 lbs. For 2-3 weeks; may use of stairs with assistance first few times. No driving until no pain and off pain meds with narcotics. Avoid intercourse 4-6 weeks, no douching or tampon use. You may walk but check with obstetrician before starting or resuming an exercise program.         Discharge Instructions/Special Treatment/Home Care Needs:     Continue prenatal vitamins. Continue to use squirt bottle with warm water on your episiotomy for comfort after each bathroom use as desired. Call the office for the following:     Fever over 101 degrees by mouth. Vaginal bleeding heavier than a normal menstrual period or clots larger than a golf ball. Red streaks or increased swelling of legs, painful red streaks on your breast.  Painful urination, constipation and increased pain or swelling or discharge with your incision. Pain Management:     Pain Management:   Take Acetaminophen (Tylenol) or Ibuprofen (Advil, Motrin), as directed for pain. Use a warm Sitz bath 3 times daily to relieve episiotomy or hemorrhoidal discomfort. For hemorrhoidal discomfort, use Tucks and Anusol cream as needed and directed.     Follow-Up Care:     Appointment with MD:   Follow-up Appointments   Procedures    FOLLOW UP VISIT Appointment in: 6 Weeks     Standing Status:   Standing     Number of Occurrences:   1     Order Specific Question:   Appointment in     Answer:   Jose Crowell     Telephone number: [phone]      Signed By: Jill Parham MD Date: 12/7/2018 Time: 7:41 AM

## 2019-01-10 NOTE — PROGRESS NOTES
Postpartum evaluation    Arturo Clemente is a 43 y.o. female who presents for a postpartum exam.     She is now six weeks post normal spontaneous vaginal delivery. Her baby is doing well. She has had no menses since delivery. She has had the following significant problems since her delivery: none    The patient is bottle and breast feeding without difficulty. The patient would like to use BTL for birth control. Scheduled for Feb 8. She is currently taking: no medications. She is due for her next AE in 12 months. Visit Vitals  /82   Ht 5' 4\" (1.626 m)   Wt 221 lb (100.2 kg)   Breastfeeding?  Yes   BMI 37.93 kg/m²       PHYSICAL EXAMINATION    Constitutional  · Appearance: well-nourished, well developed, alert, in no acute distress    HENT  · Head and Face: appears normal    Neck  · Inspection/Palpation: normal appearance, no masses or tenderness  · Lymph Nodes: no lymphadenopathy present  · Thyroid: gland size normal, nontender, no nodules or masses present on palpation    Breasts  · Inspection of Breasts: breasts symmetrical, no skin changes, no discharge present, nipple appearance normal, no skin retraction present  · Palpation of Breasts and Axillae: no masses present on palpation, no breast tenderness  · Axillary Lymph Nodes: no lymphadenopathy present    Gastrointestinal  · Abdominal Examination: abdomen non-tender to palpation, normal bowel sounds, no masses present  · Liver and spleen: no hepatomegaly present, spleen not palpable  · Hernias: no hernias identified    Genitourinary  · External Genitalia: normal appearance for age, no discharge present, no tenderness present, no inflammatory lesions present, no masses present, no atrophy present  · Vagina: normal vaginal vault without central or paravaginal defects, no discharge present, no inflammatory lesions present, no masses present  · Bladder: non-tender to palpation  · Urethra: appears normal  · Cervix: normal   · Uterus: normal size, shape and consistency  · Adnexa: no adnexal tenderness present, no adnexal masses present  · Perineum: perineum within normal limits, no evidence of trauma, no rashes or skin lesions present  · Anus: anus within normal limits, no hemorrhoids present  · Inguinal Lymph Nodes: no lymphadenopathy present    Skin  · General Inspection: no rash, no lesions identified    Neurologic/Psychiatric  · Mental Status:  · Orientation: grossly oriented to person, place and time  · Mood and Affect: mood normal, affect appropriate    Assessment:  Normal postpartum check    Plan:  RTO for AE. Rx for contraception: TL at [address] [place] [address] scheduled.

## 2019-01-16 ENCOUNTER — OFFICE VISIT (OUTPATIENT)
Dept: OBGYN CLINIC | Age: 39
End: 2019-01-16

## 2019-01-16 VITALS
WEIGHT: 221 LBS | BODY MASS INDEX: 37.73 KG/M2 | SYSTOLIC BLOOD PRESSURE: 136 MMHG | DIASTOLIC BLOOD PRESSURE: 82 MMHG | HEIGHT: 64 IN

## 2019-01-16 PROBLEM — E66.01 SEVERE OBESITY (HCC): Status: ACTIVE | Noted: 2019-01-16

## 2020-09-27 ENCOUNTER — APPOINTMENT (OUTPATIENT)
Dept: GENERAL RADIOLOGY | Age: 40
End: 2020-09-27
Attending: STUDENT IN AN ORGANIZED HEALTH CARE EDUCATION/TRAINING PROGRAM
Payer: MEDICAID

## 2020-09-27 ENCOUNTER — HOSPITAL ENCOUNTER (EMERGENCY)
Age: 40
Discharge: HOME | End: 2020-09-27
Attending: EMERGENCY MEDICINE
Payer: MEDICAID

## 2020-09-27 VITALS
DIASTOLIC BLOOD PRESSURE: 71 MMHG | BODY MASS INDEX: 32.95 KG/M2 | OXYGEN SATURATION: 98 % | HEART RATE: 74 BPM | SYSTOLIC BLOOD PRESSURE: 129 MMHG | WEIGHT: 193 LBS | RESPIRATION RATE: 18 BRPM | HEIGHT: 64 IN | TEMPERATURE: 98.6 F

## 2020-09-27 DIAGNOSIS — K59.00 CONSTIPATION, UNSPECIFIED CONSTIPATION TYPE: Primary | ICD-10-CM

## 2020-09-27 LAB
ALBUMIN SERPL-MCNC: 3.9 G/DL (ref 3.5–5)
ALBUMIN/GLOB SERPL: 1.2 {RATIO} (ref 1.1–2.2)
ALP SERPL-CCNC: 54 U/L (ref 45–117)
ALT SERPL-CCNC: 44 U/L (ref 12–78)
AMORPH CRY URNS QL MICRO: ABNORMAL
ANION GAP SERPL CALC-SCNC: 9 MMOL/L (ref 5–15)
APPEARANCE UR: ABNORMAL
AST SERPL-CCNC: 23 U/L (ref 15–37)
BACTERIA URNS QL MICRO: NEGATIVE /HPF
BASOPHILS # BLD: 0 K/UL (ref 0–0.1)
BASOPHILS NFR BLD: 0 % (ref 0–1)
BILIRUB SERPL-MCNC: 0.3 MG/DL (ref 0.2–1)
BILIRUB UR QL: NEGATIVE
BUN SERPL-MCNC: 16 MG/DL (ref 6–20)
BUN/CREAT SERPL: 20 (ref 12–20)
CALCIUM SERPL-MCNC: 8.6 MG/DL (ref 8.5–10.1)
CHLORIDE SERPL-SCNC: 106 MMOL/L (ref 97–108)
CO2 SERPL-SCNC: 25 MMOL/L (ref 21–32)
COLOR UR: ABNORMAL
COMMENT, HOLDF: NORMAL
CREAT SERPL-MCNC: 0.79 MG/DL (ref 0.55–1.02)
DIFFERENTIAL METHOD BLD: ABNORMAL
EOSINOPHIL # BLD: 0.2 K/UL (ref 0–0.4)
EOSINOPHIL NFR BLD: 2 % (ref 0–7)
EPITH CASTS URNS QL MICRO: ABNORMAL /LPF
ERYTHROCYTE [DISTWIDTH] IN BLOOD BY AUTOMATED COUNT: 13.9 % (ref 11.5–14.5)
GLOBULIN SER CALC-MCNC: 3.3 G/DL (ref 2–4)
GLUCOSE SERPL-MCNC: 102 MG/DL (ref 65–100)
GLUCOSE UR STRIP.AUTO-MCNC: NEGATIVE MG/DL
HCT VFR BLD AUTO: 38.6 % (ref 35–47)
HGB BLD-MCNC: 12.5 G/DL (ref 11.5–16)
HGB UR QL STRIP: NEGATIVE
IMM GRANULOCYTES # BLD AUTO: 0 K/UL (ref 0–0.04)
IMM GRANULOCYTES NFR BLD AUTO: 0 % (ref 0–0.5)
KETONES UR QL STRIP.AUTO: NEGATIVE MG/DL
LEUKOCYTE ESTERASE UR QL STRIP.AUTO: ABNORMAL
LYMPHOCYTES # BLD: 2.1 K/UL (ref 0.8–3.5)
LYMPHOCYTES NFR BLD: 15 % (ref 12–49)
MCH RBC QN AUTO: 30.6 PG (ref 26–34)
MCHC RBC AUTO-ENTMCNC: 32.4 G/DL (ref 30–36.5)
MCV RBC AUTO: 94.4 FL (ref 80–99)
MONOCYTES # BLD: 1.1 K/UL (ref 0–1)
MONOCYTES NFR BLD: 8 % (ref 5–13)
NEUTS SEG # BLD: 10.3 K/UL (ref 1.8–8)
NEUTS SEG NFR BLD: 75 % (ref 32–75)
NITRITE UR QL STRIP.AUTO: NEGATIVE
NRBC # BLD: 0 K/UL (ref 0–0.01)
NRBC BLD-RTO: 0 PER 100 WBC
PH UR STRIP: 7.5 [PH] (ref 5–8)
PLATELET # BLD AUTO: 366 K/UL (ref 150–400)
PMV BLD AUTO: 9.5 FL (ref 8.9–12.9)
POTASSIUM SERPL-SCNC: 3.8 MMOL/L (ref 3.5–5.1)
PROT SERPL-MCNC: 7.2 G/DL (ref 6.4–8.2)
PROT UR STRIP-MCNC: NEGATIVE MG/DL
RBC # BLD AUTO: 4.09 M/UL (ref 3.8–5.2)
RBC #/AREA URNS HPF: ABNORMAL /HPF (ref 0–5)
SAMPLES BEING HELD,HOLD: NORMAL
SODIUM SERPL-SCNC: 140 MMOL/L (ref 136–145)
SP GR UR REFRACTOMETRY: 1.02 (ref 1–1.03)
UR CULT HOLD, URHOLD: NORMAL
UROBILINOGEN UR QL STRIP.AUTO: 0.2 EU/DL (ref 0.2–1)
WBC # BLD AUTO: 13.8 K/UL (ref 3.6–11)
WBC URNS QL MICRO: ABNORMAL /HPF (ref 0–4)

## 2020-09-27 PROCEDURE — 74018 RADEX ABDOMEN 1 VIEW: CPT

## 2020-09-27 PROCEDURE — 99282 EMERGENCY DEPT VISIT SF MDM: CPT

## 2020-09-27 PROCEDURE — 85025 COMPLETE CBC W/AUTO DIFF WBC: CPT

## 2020-09-27 PROCEDURE — 80053 COMPREHEN METABOLIC PANEL: CPT

## 2020-09-27 PROCEDURE — 74011250637 HC RX REV CODE- 250/637: Performed by: EMERGENCY MEDICINE

## 2020-09-27 PROCEDURE — 81001 URINALYSIS AUTO W/SCOPE: CPT

## 2020-09-27 PROCEDURE — 36415 COLL VENOUS BLD VENIPUNCTURE: CPT

## 2020-09-27 RX ORDER — MAGNESIUM CITRATE
296 SOLUTION, ORAL ORAL
Status: COMPLETED | OUTPATIENT
Start: 2020-09-27 | End: 2020-09-27

## 2020-09-27 RX ADMIN — MAGNESIUM CITRATE 296 ML: 1.75 LIQUID ORAL at 21:32

## 2020-09-27 NOTE — LETTER
Wilmer Sanchez was seen and treated in our emergency department on 9/27/2020. She may return to work on 9-28-20. If you have any questions or concerns, please don't hesitate to call.  
 
 
Gracy Buchanan MD

## 2020-09-27 NOTE — LETTER
NOTIFICATION RETURN TO WORK / SCHOOL 
 
9/27/2020 9:36 PM 
 
Ms. Gillian Burroughs [address] 
[address] [facility] [address] To Whom It May Concern: 
 
Ko Gutierrez is currently under the care of [facility] EMERGENCY DEPT. She will return to work/school on:  9/29/2020 If there are questions or concerns please have the patient contact our office. Sincerely, Kendal Cason RN

## 2020-09-27 NOTE — ED TRIAGE NOTES
Pt had near syncopal event while attempting to have a difficult BM this AM. Pt felt shaky and was sweating. Pt administered a Fleet enema this AM without success and is experiencing rectal pain and pressure. PMH of IBS, last normal BM was approx a week ago.

## 2020-09-27 NOTE — ED PROVIDER NOTES
79-year-old female with a history of constipation presents with a chief complaint of constipation and abdominal pressure. Patient reports that this morning she drank her coffee and some water. She subsequently went to have a bowel movement but was unable to produce any stool. She reports having significant lower abdominal pressure during that time associated with some diaphoresis and shaking. She gave herself an enema with little relief. She denies any nausea, vomiting, blood in her stool or urine. She endorses some dysuria but denies any vaginal bleeding or discharge. She is also not had a fever.              Past Medical History:   Diagnosis Date    Gallstones     Genital herpes     25 years ago    High cholesterol     Meningitis     Sleep apnea        Past Surgical History:   Procedure Laterality Date    HX DILATION AND CURETTAGE      age 22    HX ORTHOPAEDIC      right leg tissue removal         Family History:   Problem Relation Age of Onset    Hypertension Mother     Diabetes Mother     Breast Cancer Maternal Grandmother     Hypertension Maternal Grandmother     Ovarian Cancer Maternal Grandmother     Kidney Disease Maternal Grandmother     Diabetes Maternal Uncle        Social History     Socioeconomic History    Marital status: SINGLE     Spouse name: Not on file    Number of children: Not on file    Years of education: Not on file    Highest education level: Not on file   Occupational History    Occupation: Customer Service   Social Needs    Financial resource strain: Somewhat hard    Food insecurity     Worry: Never true     Inability: Never true    Transportation needs     Medical: No     Non-medical: No   Tobacco Use    Smoking status: Former Smoker     Years: 20.00     Types: Cigarettes     Last attempt to quit: 10/4/2018     Years since quittin.9    Smokeless tobacco: Never Used   Substance and Sexual Activity    Alcohol use: No    Drug use: No    Sexual activity: Yes     Partners: Male     Birth control/protection: None   Lifestyle    Physical activity     Days per week: 1 day     Minutes per session: 10 min    Stress: Not at all   Relationships    Social connections     Talks on phone: More than three times a week     Gets together: More than three times a week     Attends Islam service: Never     Active member of club or organization: No     Attends meetings of clubs or organizations: Never     Relationship status: Never     Intimate partner violence     Fear of current or ex partner: No     Emotionally abused: No     Physically abused: No     Forced sexual activity: No   Other Topics Concern     Service No    Blood Transfusions No    Caffeine Concern No    Occupational Exposure No    Hobby Hazards No    Sleep Concern No    Stress Concern No    Weight Concern No    Special Diet No    Back Care No    Exercise Yes    Bike Helmet Yes    Seat Belt Yes    Self-Exams Yes   Social History Narrative    Not on file         ALLERGIES: Aspirin; Pcn [penicillins]; and Tylenol [acetaminophen]    Review of Systems   Constitutional: Negative for fever. Gastrointestinal: Positive for constipation. Vitals:    09/27/20 1834   BP: 129/71   Pulse: 74   Resp: 18   Temp: 98.6 °F (37 °C)   SpO2: 98%   Weight: 87.5 kg (193 lb)   Height: 5' 4\" (1.626 m)            Physical Exam  Vitals signs and nursing note reviewed. Constitutional:       General: She is not in acute distress. Appearance: Normal appearance. She is not ill-appearing, toxic-appearing or diaphoretic. HENT:      Head: Normocephalic and atraumatic. Eyes:      Extraocular Movements: Extraocular movements intact. Neck:      Musculoskeletal: Normal range of motion. Cardiovascular:      Rate and Rhythm: Normal rate and regular rhythm. Pulses: Normal pulses. Heart sounds: Normal heart sounds. Pulmonary:      Effort: Pulmonary effort is normal. No respiratory distress. Breath sounds: Normal breath sounds. No wheezing. Abdominal:      General: Abdomen is flat. Bowel sounds are normal. There is no distension. Palpations: Abdomen is soft. Tenderness: There is no abdominal tenderness. There is no guarding. Musculoskeletal: Normal range of motion. Skin:     General: Skin is warm and dry. Neurological:      Mental Status: She is alert and oriented to person, place, and time. Psychiatric:         Mood and Affect: Mood normal.          MDM  Number of Diagnoses or Management Options  Constipation, unspecified constipation type:   Diagnosis management comments: 76-year-old female presents with constipation. She has no significant tenderness on abdominal exam.  KUB shows a significant stool burden, mostly on the right. Urinalysis shows no evidence of infection. Laboratory studies are unremarkable. I will treat her with magnesium citrate. She was given return precautions and voiced understanding of these. She is comfortable and agreeable with the plan of care.          Procedures

## 2020-09-27 NOTE — LETTER
[facility] EMERGENCY DEPT 
[address] [place] [address] [phone] Work/School Note Date: 9/27/2020 To Whom It May concern: 
 
Ron Alejandra was seen and treated today in the emergency room by the following provider(s): 
Attending Provider: Oren Sosa MD.   
 
Ron Alejandra may return to work on 9/29/2020.  
 
Sincerely, 
 
 
 
 
Ray Conrad RN

## 2020-09-29 ENCOUNTER — HOSPITAL ENCOUNTER (EMERGENCY)
Age: 40
Discharge: HOME | End: 2020-09-29
Attending: STUDENT IN AN ORGANIZED HEALTH CARE EDUCATION/TRAINING PROGRAM
Payer: MEDICAID

## 2020-09-29 ENCOUNTER — APPOINTMENT (OUTPATIENT)
Dept: ULTRASOUND IMAGING | Age: 40
End: 2020-09-29
Attending: STUDENT IN AN ORGANIZED HEALTH CARE EDUCATION/TRAINING PROGRAM
Payer: MEDICAID

## 2020-09-29 ENCOUNTER — APPOINTMENT (OUTPATIENT)
Dept: CT IMAGING | Age: 40
End: 2020-09-29
Attending: STUDENT IN AN ORGANIZED HEALTH CARE EDUCATION/TRAINING PROGRAM
Payer: MEDICAID

## 2020-09-29 VITALS
HEART RATE: 75 BPM | TEMPERATURE: 98.5 F | WEIGHT: 198.41 LBS | BODY MASS INDEX: 34.06 KG/M2 | OXYGEN SATURATION: 100 % | DIASTOLIC BLOOD PRESSURE: 69 MMHG | SYSTOLIC BLOOD PRESSURE: 114 MMHG | RESPIRATION RATE: 18 BRPM

## 2020-09-29 DIAGNOSIS — N30.00 ACUTE CYSTITIS WITHOUT HEMATURIA: ICD-10-CM

## 2020-09-29 DIAGNOSIS — N83.202 LEFT OVARIAN CYST: Primary | ICD-10-CM

## 2020-09-29 LAB
ALBUMIN SERPL-MCNC: 3.7 G/DL (ref 3.5–5)
ALBUMIN/GLOB SERPL: 1.1 {RATIO} (ref 1.1–2.2)
ALP SERPL-CCNC: 50 U/L (ref 45–117)
ALT SERPL-CCNC: 43 U/L (ref 12–78)
ANION GAP SERPL CALC-SCNC: 4 MMOL/L (ref 5–15)
APPEARANCE UR: ABNORMAL
AST SERPL-CCNC: 14 U/L (ref 15–37)
BACTERIA URNS QL MICRO: ABNORMAL /HPF
BASOPHILS # BLD: 0.1 K/UL (ref 0–0.1)
BASOPHILS NFR BLD: 0 % (ref 0–1)
BILIRUB SERPL-MCNC: 0.4 MG/DL (ref 0.2–1)
BILIRUB UR QL: NEGATIVE
BUN SERPL-MCNC: 14 MG/DL (ref 6–20)
BUN/CREAT SERPL: 17 (ref 12–20)
CALCIUM SERPL-MCNC: 8.5 MG/DL (ref 8.5–10.1)
CAOX CRY URNS QL MICRO: ABNORMAL
CHLORIDE SERPL-SCNC: 109 MMOL/L (ref 97–108)
CO2 SERPL-SCNC: 29 MMOL/L (ref 21–32)
COLOR UR: ABNORMAL
COMMENT, HOLDF: NORMAL
CREAT SERPL-MCNC: 0.84 MG/DL (ref 0.55–1.02)
DIFFERENTIAL METHOD BLD: ABNORMAL
EOSINOPHIL # BLD: 0.4 K/UL (ref 0–0.4)
EOSINOPHIL NFR BLD: 4 % (ref 0–7)
EPITH CASTS URNS QL MICRO: ABNORMAL /LPF
ERYTHROCYTE [DISTWIDTH] IN BLOOD BY AUTOMATED COUNT: 14.3 % (ref 11.5–14.5)
GLOBULIN SER CALC-MCNC: 3.3 G/DL (ref 2–4)
GLUCOSE SERPL-MCNC: 86 MG/DL (ref 65–100)
GLUCOSE UR STRIP.AUTO-MCNC: NEGATIVE MG/DL
HCG UR QL: NEGATIVE
HCT VFR BLD AUTO: 37.2 % (ref 35–47)
HGB BLD-MCNC: 11.9 G/DL (ref 11.5–16)
HGB UR QL STRIP: ABNORMAL
IMM GRANULOCYTES # BLD AUTO: 0 K/UL (ref 0–0.04)
IMM GRANULOCYTES NFR BLD AUTO: 0 % (ref 0–0.5)
KETONES UR QL STRIP.AUTO: NEGATIVE MG/DL
LEUKOCYTE ESTERASE UR QL STRIP.AUTO: ABNORMAL
LIPASE SERPL-CCNC: 174 U/L (ref 73–393)
LYMPHOCYTES # BLD: 3.7 K/UL (ref 0.8–3.5)
LYMPHOCYTES NFR BLD: 31 % (ref 12–49)
MAGNESIUM SERPL-MCNC: 2.2 MG/DL (ref 1.6–2.4)
MCH RBC QN AUTO: 30.5 PG (ref 26–34)
MCHC RBC AUTO-ENTMCNC: 32 G/DL (ref 30–36.5)
MCV RBC AUTO: 95.4 FL (ref 80–99)
MONOCYTES # BLD: 0.8 K/UL (ref 0–1)
MONOCYTES NFR BLD: 7 % (ref 5–13)
NEUTS SEG # BLD: 6.8 K/UL (ref 1.8–8)
NEUTS SEG NFR BLD: 58 % (ref 32–75)
NITRITE UR QL STRIP.AUTO: NEGATIVE
NRBC # BLD: 0 K/UL (ref 0–0.01)
NRBC BLD-RTO: 0 PER 100 WBC
PH UR STRIP: 6.5 [PH] (ref 5–8)
PLATELET # BLD AUTO: 337 K/UL (ref 150–400)
PMV BLD AUTO: 9.4 FL (ref 8.9–12.9)
POTASSIUM SERPL-SCNC: 3.9 MMOL/L (ref 3.5–5.1)
PROT SERPL-MCNC: 7 G/DL (ref 6.4–8.2)
PROT UR STRIP-MCNC: NEGATIVE MG/DL
RBC # BLD AUTO: 3.9 M/UL (ref 3.8–5.2)
RBC #/AREA URNS HPF: ABNORMAL /HPF (ref 0–5)
SAMPLES BEING HELD,HOLD: NORMAL
SODIUM SERPL-SCNC: 142 MMOL/L (ref 136–145)
SP GR UR REFRACTOMETRY: 1.02 (ref 1–1.03)
UROBILINOGEN UR QL STRIP.AUTO: 1 EU/DL (ref 0.2–1)
WBC # BLD AUTO: 11.8 K/UL (ref 3.6–11)
WBC URNS QL MICRO: ABNORMAL /HPF (ref 0–4)

## 2020-09-29 PROCEDURE — 74011000636 HC RX REV CODE- 636: Performed by: RADIOLOGY

## 2020-09-29 PROCEDURE — 81025 URINE PREGNANCY TEST: CPT

## 2020-09-29 PROCEDURE — 83735 ASSAY OF MAGNESIUM: CPT

## 2020-09-29 PROCEDURE — 85025 COMPLETE CBC W/AUTO DIFF WBC: CPT

## 2020-09-29 PROCEDURE — 76856 US EXAM PELVIC COMPLETE: CPT

## 2020-09-29 PROCEDURE — 83690 ASSAY OF LIPASE: CPT

## 2020-09-29 PROCEDURE — 74177 CT ABD & PELVIS W/CONTRAST: CPT

## 2020-09-29 PROCEDURE — 74011250637 HC RX REV CODE- 250/637: Performed by: STUDENT IN AN ORGANIZED HEALTH CARE EDUCATION/TRAINING PROGRAM

## 2020-09-29 PROCEDURE — 81001 URINALYSIS AUTO W/SCOPE: CPT

## 2020-09-29 PROCEDURE — 74011250636 HC RX REV CODE- 250/636: Performed by: STUDENT IN AN ORGANIZED HEALTH CARE EDUCATION/TRAINING PROGRAM

## 2020-09-29 PROCEDURE — 36415 COLL VENOUS BLD VENIPUNCTURE: CPT

## 2020-09-29 PROCEDURE — 76830 TRANSVAGINAL US NON-OB: CPT

## 2020-09-29 PROCEDURE — 80053 COMPREHEN METABOLIC PANEL: CPT

## 2020-09-29 PROCEDURE — 96374 THER/PROPH/DIAG INJ IV PUSH: CPT

## 2020-09-29 PROCEDURE — 99284 EMERGENCY DEPT VISIT MOD MDM: CPT

## 2020-09-29 PROCEDURE — 87086 URINE CULTURE/COLONY COUNT: CPT

## 2020-09-29 RX ORDER — ONDANSETRON 4 MG/1
4 TABLET, ORALLY DISINTEGRATING ORAL
Qty: 12 TAB | Refills: 0 | Status: SHIPPED | OUTPATIENT
Start: 2020-09-29

## 2020-09-29 RX ORDER — CEPHALEXIN 500 MG/1
500 CAPSULE ORAL 2 TIMES DAILY
Qty: 14 CAP | Refills: 0 | Status: SHIPPED | OUTPATIENT
Start: 2020-09-29 | End: 2020-10-06

## 2020-09-29 RX ORDER — CEPHALEXIN 250 MG/1
500 CAPSULE ORAL
Status: COMPLETED | OUTPATIENT
Start: 2020-09-29 | End: 2020-09-29

## 2020-09-29 RX ORDER — ONDANSETRON 2 MG/ML
4 INJECTION INTRAMUSCULAR; INTRAVENOUS ONCE
Status: COMPLETED | OUTPATIENT
Start: 2020-09-29 | End: 2020-09-29

## 2020-09-29 RX ADMIN — CEPHALEXIN 500 MG: 250 CAPSULE ORAL at 19:14

## 2020-09-29 RX ADMIN — ONDANSETRON 4 MG: 2 INJECTION INTRAMUSCULAR; INTRAVENOUS at 18:04

## 2020-09-29 RX ADMIN — IOPAMIDOL 100 ML: 755 INJECTION, SOLUTION INTRAVENOUS at 18:22

## 2020-09-29 RX ADMIN — SODIUM CHLORIDE 1000 ML: 900 INJECTION, SOLUTION INTRAVENOUS at 18:04

## 2020-09-29 NOTE — ED PROVIDER NOTES
Chief Complaint   Patient presents with    Abdominal Pain     This is a 76-year-old female presenting with left lower quadrant abdominal pain for the last 2 days, she was in the department 2 days ago and reported feeling constipated, had a KUB at that time showing moderate stool burden and use magnesium citrate, had a bowel movement but did not feel any better afterwards prompting her to seek care again this evening. She feels nauseous, denies any vomiting or diarrhea, denies any dysuria or increased urinary frequency but reports that her symptoms feel similar nature to the last time she was diagnosed with a urinary tract infection. She reported having yellow vaginal discharge in triage, denies any new sexual partners, denies any painful intercourse or any irregular vaginal bleeding, and specifically declined a pelvic exam here understanding the indication being to examine for a possible pelvic infection. She has no history of STI. No other systemic complaints. Symptoms are moderate in nature without any alleviating or exacerbating factors.       Past Medical History:   Diagnosis Date    Gallstones     Genital herpes     25 years ago    High cholesterol     Meningitis     Sleep apnea        Past Surgical History:   Procedure Laterality Date    HX DILATION AND CURETTAGE      age 22    HX ORTHOPAEDIC      right leg tissue removal         Family History:   Problem Relation Age of Onset    Hypertension Mother     Diabetes Mother     Breast Cancer Maternal Grandmother     Hypertension Maternal Grandmother     Ovarian Cancer Maternal Grandmother     Kidney Disease Maternal Grandmother     Diabetes Maternal Uncle        Social History     Socioeconomic History    Marital status: SINGLE     Spouse name: Not on file    Number of children: Not on file    Years of education: Not on file    Highest education level: Not on file   Occupational History    Occupation: [employer] Financial resource strain: Somewhat hard    Food insecurity     Worry: Never true     Inability: Never true    Transportation needs     Medical: No     Non-medical: No   Tobacco Use    Smoking status: Former Smoker     Years: 20.00     Types: Cigarettes     Last attempt to quit: 10/4/2018     Years since quittin.9    Smokeless tobacco: Never Used   Substance and Sexual Activity    Alcohol use: No    Drug use: No    Sexual activity: Yes     Partners: Male     Birth control/protection: None   Lifestyle    Physical activity     Days per week: 1 day     Minutes per session: 10 min    Stress: Not at all   Relationships    Social connections     Talks on phone: More than three times a week     Gets together: More than three times a week     Attends Tenriism service: Never     Active member of club or organization: No     Attends meetings of clubs or organizations: Never     Relationship status: Never     Intimate partner violence     Fear of current or ex partner: No     Emotionally abused: No     Physically abused: No     Forced sexual activity: No   Other Topics Concern     Service No    Blood Transfusions No    Caffeine Concern No    Occupational Exposure No    Hobby Hazards No    Sleep Concern No    Stress Concern No    Weight Concern No    Special Diet No    Back Care No    Exercise Yes    Bike Helmet Yes    Seat Belt Yes    Self-Exams Yes   Social History Narrative    Not on file         ALLERGIES: Aspirin; Pcn [penicillins]; and Tylenol [acetaminophen]    Review of Systems   Constitutional: Negative for fever. Respiratory: Negative for shortness of breath. Cardiovascular: Negative for chest pain. Gastrointestinal: Positive for abdominal pain and nausea. Genitourinary: Negative for difficulty urinating. Musculoskeletal: Negative for back pain. Skin: Negative for rash. Neurological: Negative for headaches. Psychiatric/Behavioral: Negative for confusion. All other systems reviewed and are negative.       Vitals:    09/29/20 1830 09/29/20 1900 09/29/20 2015 09/29/20 2030   BP: (!) 116/57 120/66 (!) 111/58 112/62   Pulse:       Resp:       Temp:       SpO2: 98%  100% 97%   Weight:                Physical Exam  General:  Awake and alert, NAD  HEENT:  NC/AT, equal pupils, moist mucous membranes  Neck:   Normal inspection, full range of motion  Cardiac:  RRR, no murmurs  Respiratory:  Clear bilaterally, no wheezes, rales, rhonchi  Abdomen:  Soft and nondistended, tender to palpation across the left lower quadrant and left groin, no guarding  Extremities: Warm and well perfused, no peripheral edema  Neuro:  Moving all extremities symmetrically without gross motor deficit  Skin:   No rashes or pallor    RESULTS  Recent Results (from the past 12 hour(s))   URINALYSIS W/MICROSCOPIC    Collection Time: 09/29/20  5:56 PM   Result Value Ref Range    Color YELLOW/STRAW      Appearance CLOUDY (A) CLEAR      Specific gravity 1.025 1.003 - 1.030      pH (UA) 6.5 5.0 - 8.0      Protein Negative NEG mg/dL    Glucose Negative NEG mg/dL    Ketone Negative NEG mg/dL    Bilirubin Negative NEG      Blood SMALL (A) NEG      Urobilinogen 1.0 0.2 - 1.0 EU/dL    Nitrites Negative NEG      Leukocyte Esterase LARGE (A) NEG      WBC 20-50 0 - 4 /hpf    RBC 5-10 0 - 5 /hpf    Epithelial cells MODERATE (A) FEW /lpf    Bacteria 4+ (A) NEG /hpf    CA Oxalate crystals FEW (A) NEG     CBC WITH AUTOMATED DIFF    Collection Time: 09/29/20  5:56 PM   Result Value Ref Range    WBC 11.8 (H) 3.6 - 11.0 K/uL    RBC 3.90 3.80 - 5.20 M/uL    HGB 11.9 11.5 - 16.0 g/dL    HCT 37.2 35.0 - 47.0 %    MCV 95.4 80.0 - 99.0 FL    MCH 30.5 26.0 - 34.0 PG    MCHC 32.0 30.0 - 36.5 g/dL    RDW 14.3 11.5 - 14.5 %    PLATELET 168 623 - 012 K/uL    MPV 9.4 8.9 - 12.9 FL    NRBC 0.0 0  WBC    ABSOLUTE NRBC 0.00 0.00 - 0.01 K/uL    NEUTROPHILS 58 32 - 75 %    LYMPHOCYTES 31 12 - 49 %    MONOCYTES 7 5 - 13 % EOSINOPHILS 4 0 - 7 %    BASOPHILS 0 0 - 1 %    IMMATURE GRANULOCYTES 0 0.0 - 0.5 %    ABS. NEUTROPHILS 6.8 1.8 - 8.0 K/UL    ABS. LYMPHOCYTES 3.7 (H) 0.8 - 3.5 K/UL    ABS. MONOCYTES 0.8 0.0 - 1.0 K/UL    ABS. EOSINOPHILS 0.4 0.0 - 0.4 K/UL    ABS. BASOPHILS 0.1 0.0 - 0.1 K/UL    ABS. IMM. GRANS. 0.0 0.00 - 0.04 K/UL    DF AUTOMATED     METABOLIC PANEL, COMPREHENSIVE    Collection Time: 09/29/20  5:56 PM   Result Value Ref Range    Sodium 142 136 - 145 mmol/L    Potassium 3.9 3.5 - 5.1 mmol/L    Chloride 109 (H) 97 - 108 mmol/L    CO2 29 21 - 32 mmol/L    Anion gap 4 (L) 5 - 15 mmol/L    Glucose 86 65 - 100 mg/dL    BUN 14 6 - 20 MG/DL    Creatinine 0.84 0.55 - 1.02 MG/DL    BUN/Creatinine ratio 17 12 - 20      GFR est AA >60 >60 ml/min/1.73m2    GFR est non-AA >60 >60 ml/min/1.73m2    Calcium 8.5 8.5 - 10.1 MG/DL    Bilirubin, total 0.4 0.2 - 1.0 MG/DL    ALT (SGPT) 43 12 - 78 U/L    AST (SGOT) 14 (L) 15 - 37 U/L    Alk. phosphatase 50 45 - 117 U/L    Protein, total 7.0 6.4 - 8.2 g/dL    Albumin 3.7 3.5 - 5.0 g/dL    Globulin 3.3 2.0 - 4.0 g/dL    A-G Ratio 1.1 1.1 - 2.2     LIPASE    Collection Time: 09/29/20  5:56 PM   Result Value Ref Range    Lipase 174 73 - 393 U/L   MAGNESIUM    Collection Time: 09/29/20  5:56 PM   Result Value Ref Range    Magnesium 2.2 1.6 - 2.4 mg/dL   SAMPLES BEING HELD    Collection Time: 09/29/20  5:56 PM   Result Value Ref Range    SAMPLES BEING HELD [email or web address]     COMMENT        Add-on orders for these samples will be processed based on acceptable specimen integrity and analyte stability, which may vary by analyte. HCG URINE, QL. - POC    Collection Time: 09/29/20  6:07 PM   Result Value Ref Range    Pregnancy test,urine (POC) Negative NEG          IMAGING  Ct Abd Pelv W Cont    Result Date: 9/29/2020  IMPRESSION: Slight prominence of the left adnexa. Us Transvaginal    Result Date: 9/29/2020  IMPRESSION: 1. Small uterine fibroid.  2. Prominent sized left ovary with hemorrhagic cyst measuring 2 cm. Us Pelv Non Obs    Result Date: 9/29/2020  IMPRESSION: 1. Small uterine fibroid. 2. Prominent sized left ovary with hemorrhagic cyst measuring 2 cm. Procedures - none unless documented below    ED course: Labs and imaging reviewed, recommend Keflex for UTI with culture pending, 2 mm hemorrhagic ovarian cyst noted on the left side with good flow to the ovary on ultrasound. Pain well controlled. No signs of sepsis. Will have her follow up with her OB/GYN, discharge home, return precautions communicated. The patient has been given verbal and written advice regarding today's presentation including reasons to re-attend, specifically signs and symptoms of concern or worsening condition were discussed and understood by the patient.      Impression: Acute cystitis, left ovarian cyst  Disposition: Discharge home

## 2020-09-29 NOTE — ED TRIAGE NOTES
Patient arrives with c/o lower abdominal pressure, dysuria and yellow vaginal discharge. States she was here a few days ago for the abdominal pain and treated for constipation with relief but pain continues.

## 2020-09-29 NOTE — LETTER
Shivam Saravia was seen and treated in our emergency department on 9/29/2020. She may return to work on 10/02/2020. If you have any questions or concerns, please don't hesitate to call.  
 
 
Shelley Wang MD

## 2020-09-30 LAB
BACTERIA SPEC CULT: NORMAL
CC UR VC: NORMAL
SERVICE CMNT-IMP: NORMAL

## 2020-09-30 NOTE — DISCHARGE INSTRUCTIONS
- Complete the full course of antibiotics for UTI as discussed  - Your ultrasound demonstrated a left ovarian cyst (2 cm) and a small uterine fibroid. Continue Motrin or naproxen as needed for abdominal pain. Follow up with OB/GYN to be reevaluated.   - Drink lots of fluids throughout the day  - Return to the emergency department for fevers, abdominal or flank pain, vomiting, or any new or worsening symptoms

## 2020-10-09 ENCOUNTER — OFFICE VISIT (OUTPATIENT)
Dept: OBGYN CLINIC | Age: 40
End: 2020-10-09
Payer: MEDICAID

## 2020-10-09 DIAGNOSIS — N83.201 CYST OF RIGHT OVARY: ICD-10-CM

## 2020-10-09 DIAGNOSIS — R10.2 PELVIC PAIN IN FEMALE: Primary | ICD-10-CM

## 2020-10-09 PROCEDURE — 99213 OFFICE O/P EST LOW 20 MIN: CPT | Performed by: OBSTETRICS & GYNECOLOGY

## 2020-10-09 NOTE — PROGRESS NOTES
Pelvic Pain evaluation    Carroll Lozano is a 38 y.o. female who complains of pelvic pain. The pain is described as sharp, shooting and stabbing, and is 10/10 in intensity. She went to the ER on 20. Pain is located in the RLQ with radiation to R back. The pain started 1 week ago. Her symptoms have been gradually improving since. Aggravating factors: movement and activity. Alleviating factors: NSAID's. Associated symptoms: constipation, nausea and sweats. The patient denies diarrhea, fever and headache. Ultrasound in the Er on 20 showed:  Transabdominal and transvaginal pelvic sonography performed. The uterus measured  9.8 x 4.7 x 6 cm. There is a 2 cm fibroid. The stripe is 1.2 cm. Small nabothian cysts in the cervix.     The right ovary measures 2.8 x 2.3 x 2.9 cm normal flow. The left ovary measured 4.5 x 2.1 x 4.7 cm, flow is normal. There is a  complicated cyst measuring 2 cm. There is a collapsing cyst measuring 1.3 cm. Trace of free fluid in the cul-de-sac.     IMPRESSION  IMPRESSION:  1. Small uterine fibroid. 2. Prominent sized left ovary with hemorrhagic cyst measuring 2 cm.     Past Medical History:   Diagnosis Date    Gallstones     Genital herpes     25 years ago    High cholesterol     Meningitis     Sleep apnea      Past Surgical History:   Procedure Laterality Date    HX DILATION AND CURETTAGE      age 22    HX ORTHOPAEDIC      right leg tissue removal    HX TUBAL LIGATION  2019     Social History     Occupational History    Occupation: Customer Service   Tobacco Use    Smoking status: Former Smoker     Years: 20.00     Types: Cigarettes     Last attempt to quit: 10/4/2018     Years since quittin.0    Smokeless tobacco: Never Used   Substance and Sexual Activity    Alcohol use: No    Drug use: No    Sexual activity: Yes     Partners: Male     Birth control/protection: Surgical     Comment: BTL     Family History   Problem Relation Age of Onset  Hypertension Mother     Diabetes Mother     Breast Cancer Maternal Grandmother     Hypertension Maternal Grandmother     Ovarian Cancer Maternal Grandmother     Kidney Disease Maternal Grandmother     Diabetes Maternal Uncle        Allergies   Allergen Reactions    Aspirin Nausea and Vomiting    Pcn [Penicillins] Other (comments)    Tylenol [Acetaminophen] Nausea Only and Vertigo     Prior to Admission medications    Medication Sig Start Date End Date Taking? Authorizing Provider   ondansetron (Zofran ODT) 4 mg disintegrating tablet Take 1 Tab by mouth every six (6) hours as needed for Nausea or Vomiting. 9/29/20   Lorrie Hussein MD   PNV KY.02/UCVNYYS fum/folic ac (PRENATAL PO) Take  by mouth. Provider, Historical   OTHER bcp    Zuri Hale MD   omeprazole (PRILOSEC) 20 mg capsule Take 20 mg by mouth as needed. Zuri Hale MD   cpap machine kit by Does Not Apply route.     Zuri Hale MD        Review of Systems: History obtained from the patient  Constitutional: negative for weight loss, fever, night sweats  Breast: negative for breast lumps, nipple discharge, galactorrhea  GI: negative for change in bowel habits, abdominal pain, black or bloody stools  : negative for frequency, dysuria, hematuria, vaginal discharge  MSK: negative for back pain, joint pain, muscle pain  Skin: negative for itching, rash, hives  Psych: negative for anxiety, depression, change in mood      Objective:    Visit Vitals  LMP 10/07/2020 (Exact Date)   Breastfeeding No       Physical Exam:     Constitutional  · Appearance: well-nourished, well developed, alert, in no acute distress    Gastrointestinal  · Abdominal Examination: abdomen non-tender to palpation, normal bowel sounds, no masses present  · Liver and spleen: no hepatomegaly present, spleen not palpable  · Hernias: no hernias identified    Genitourinary  · External Genitalia: normal appearance for age, no discharge present, no tenderness present, no inflammatory lesions present, no masses present, no atrophy present  · Vagina: normal vaginal vault without central or paravaginal defects, no discharge present, no inflammatory lesions present, no masses present  · Bladder: non-tender to palpation  · Urethra: appears normal  · Cervix: normal   · Uterus: normal size, shape and consistency  · Adnexa: no adnexal tenderness present, no adnexal masses present  · Perineum: perineum within normal limits, no evidence of trauma, no rashes or skin lesions present  · Anus: anus within normal limits, no hemorrhoids present  · Inguinal Lymph Nodes: no lymphadenopathy present    Skin  · General Inspection: no rash, no lesions identified    Neurologic/Psychiatric  · Mental Status:  · Orientation: grossly oriented to person, place and time  · Mood and Affect: mood normal, affect appropriate    Assessment:  Ruptured ovarian cyst.  First episode. Plan:   RTO for AE. If recurring episodes, advised OCP is option to control. RTO prn if symptoms persist or worsen. Instructions given to pt. Handouts given to pt.

## 2020-10-09 NOTE — PATIENT INSTRUCTIONS
Pelvic Pain: Care Instructions Your Care Instructions Pelvic pain, or pain in the lower belly, can have many causes. Often pelvic pain is not serious and gets better in a few days. If your pain continues or gets worse, you may need tests and treatment. Tell your doctor about any new symptoms. These may be signs of a serious problem. Follow-up care is a key part of your treatment and safety. Be sure to make and go to all appointments, and call your doctor if you are having problems. It's also a good idea to know your test results and keep a list of the medicines you take. How can you care for yourself at home? · Rest until you feel better. Lie down, and raise your legs by placing a pillow under your knees. · Drink plenty of fluids. You may find that small, frequent sips are easier on your stomach than if you drink a lot at once. Avoid drinks with carbonation or caffeine, such as soda pop, tea, or coffee. · Try eating several small meals instead of 2 or 3 large ones. Eat mild foods, such as rice, dry toast or crackers, bananas, and applesauce. Avoid fatty and spicy foods, other fruits, and alcohol until 48 hours after your symptoms have gone away. · Take an over-the-counter pain medicine, such as acetaminophen (Tylenol), ibuprofen (Advil, Motrin), or naproxen (Aleve). Read and follow all instructions on the label. · Do not take two or more pain medicines at the same time unless the doctor told you to. Many pain medicines have acetaminophen, which is Tylenol. Too much acetaminophen (Tylenol) can be harmful. · You can put a heating pad, a warm cloth, or moist heat on your belly to relieve pain. When should you call for help? Call your doctor now or seek immediate medical care if: 
  · You have a new or higher fever.  
  · You have unusual vaginal bleeding.  
  · You have new or worse belly or pelvic pain.  
  · You have vaginal discharge that has increased in amount or smells bad. Watch closely for changes in your health, and be sure to contact your doctor if: 
  · You do not get better as expected. Where can you learn more? Go to http://www.gray.com/ Enter [phone] in the search box to learn more about \"Pelvic Pain: Care Instructions. \" Current as of: November 8, 2019               Content Version: 12.6 © 6627-3152 [employer]. Care instructions adapted under license by [employer] (which disclaims liability or warranty for this information). If you have questions about a medical condition or this instruction, always ask your healthcare professional. [address] any warranty or liability for your use of this information.

## 2021-01-13 ENCOUNTER — OFFICE VISIT (OUTPATIENT)
Dept: OBGYN CLINIC | Age: 41
End: 2021-01-13
Payer: MEDICAID

## 2021-01-13 VITALS
SYSTOLIC BLOOD PRESSURE: 116 MMHG | HEIGHT: 64 IN | WEIGHT: 197.4 LBS | BODY MASS INDEX: 33.7 KG/M2 | DIASTOLIC BLOOD PRESSURE: 62 MMHG

## 2021-01-13 DIAGNOSIS — Z01.419 ENCOUNTER FOR GYNECOLOGICAL EXAMINATION WITHOUT ABNORMAL FINDING: Primary | ICD-10-CM

## 2021-01-13 DIAGNOSIS — Z12.4 CERVICAL CANCER SCREENING: ICD-10-CM

## 2021-01-13 PROCEDURE — 99396 PREV VISIT EST AGE 40-64: CPT | Performed by: OBSTETRICS & GYNECOLOGY

## 2021-01-13 NOTE — PATIENT INSTRUCTIONS
Well Visit, Ages 25 to 48: Care Instructions Your Care Instructions Physical exams can help you stay healthy. Your doctor has checked your overall health and may have suggested ways to take good care of yourself. He or she also may have recommended tests. At home, you can help prevent illness with healthy eating, regular exercise, and other steps. Follow-up care is a key part of your treatment and safety. Be sure to make and go to all appointments, and call your doctor if you are having problems. It's also a good idea to know your test results and keep a list of the medicines you take. How can you care for yourself at home? · Reach and stay at a healthy weight. This will lower your risk for many problems, such as obesity, diabetes, heart disease, and high blood pressure. · Get at least 30 minutes of physical activity on most days of the week. Walking is a good choice. You also may want to do other activities, such as running, swimming, cycling, or playing tennis or team sports. Discuss any changes in your exercise program with your doctor. · Do not smoke or allow others to smoke around you. If you need help quitting, talk to your doctor about stop-smoking programs and medicines. These can increase your chances of quitting for good. · Talk to your doctor about whether you have any risk factors for sexually transmitted infections (STIs). Having one sex partner (who does not have STIs and does not have sex with anyone else) is a good way to avoid these infections. · Use birth control if you do not want to have children at this time. Talk with your doctor about the choices available and what might be best for you. · Protect your skin from too much sun. When you're outdoors from 10 a.m. to 4 p.m., stay in the shade or cover up with clothing and a hat with a wide brim. Wear sunglasses that block UV rays. Even when it's cloudy, put broad-spectrum sunscreen (SPF 30 or higher) on any exposed skin. · See a dentist one or two times a year for checkups and to have your teeth cleaned. · Wear a seat belt in the car. Follow your doctor's advice about when to have certain tests. These tests can spot problems early. For everyone · Cholesterol. Have the fat (cholesterol) in your blood tested after age 21. Your doctor will tell you how often to have this done based on your age, family history, or other things that can increase your risk for heart disease. · Blood pressure. Have your blood pressure checked during a routine doctor visit. Your doctor will tell you how often to check your blood pressure based on your age, your blood pressure results, and other factors. · Vision. Talk with your doctor about how often to have a glaucoma test. 
· Diabetes. Ask your doctor whether you should have tests for diabetes. · Colon cancer. Your risk for colorectal cancer gets higher as you get older. Some experts say that adults should start regular screening at age 48 and stop at age 76. Others say to start before age 48 or continue after age 76. Talk with your doctor about your risk and when to start and stop screening. For women · Breast exam and mammogram. Talk to your doctor about when you should have a clinical breast exam and a mammogram. Medical experts differ on whether and how often women under 50 should have these tests. Your doctor can help you decide what is right for you. · Cervical cancer screening test and pelvic exam. Begin with a Pap test at age 24. The test often is part of a pelvic exam. Starting at age 27, you may choose to have a Pap test, an HPV test, or both tests at the same time (called co-testing). Talk with your doctor about how often to have testing. · Tests for sexually transmitted infections (STIs). Ask whether you should have tests for STIs. You may be at risk if you have sex with more than one person, especially if your partners do not wear condoms. For men · Tests for sexually transmitted infections (STIs). Ask whether you should have tests for STIs. You may be at risk if you have sex with more than one person, especially if you do not wear a condom. · Testicular cancer exam. Ask your doctor whether you should check your testicles regularly. · Prostate exam. Talk to your doctor about whether you should have a blood test (called a PSA test) for prostate cancer. Experts differ on whether and when men should have this test. Some experts suggest it if you are older than 39 and are -American or have a father or brother who got prostate cancer when he was younger than 72. When should you call for help? Watch closely for changes in your health, and be sure to contact your doctor if you have any problems or symptoms that concern you. Where can you learn more? Go to http://www.rodríguez.com/ Enter P072 in the search box to learn more about \"Well Visit, Ages 25 to 48: Care Instructions. \" Current as of: May 27, 2020               Content Version: 12.6 © 2006-2020 [employer]. Care instructions adapted under license by [employer] (which disclaims liability or warranty for this information). If you have questions about a medical condition or this instruction, always ask your healthcare professional. [address] any warranty or liability for your use of this information.

## 2021-01-13 NOTE — PROGRESS NOTES
Annual exam ages 40-58      Simran Brennan is a ,  38 y.o. female   Patient's last menstrual period was 2021 (exact date). She presents for her annual checkup. She is having burning while uriination. Pt states she had a previous UTI in October. She would like to know if she should be taking a prenatal vitamin. With regard to the Gardasil vaccine, she has not received it yet. Menstrual status:    Her periods are heavy in flow sometimes gushing for the first two days of her cycle, after that her flow is normal. She is using three to ten pads or tampons per day, usually regular with a 26-32 day interval with 3-7 day duration. She does not have dysmenorrhea. She reports having back cramping for the first time her last cycle (21), a heating pad helped. She reports no premenstrual symptoms. Contraception:    The current method of family planning is tubal ligation. Hormonal status:  She reports no perimenstrual type symptoms. She is not having vasomotor symptoms. The patient is not using any ERT. Sexual history:    She  reports being sexually active and has had partner(s) who are Male. She reports using the following method of birth control/protection: Surgical.    Medical conditions:    Since her last annual GYN exam about two years ago, she has not the following changes in her health history: none. Surgical history confirmed with patient. has a past surgical history that includes hx orthopaedic; hx dilation and curettage; and hx tubal ligation (2019). Pap and Mammogram History:    Her most recent Pap smear was normal, obtained 2 1/2 year(s) ago. The patient has never had a mammogram.    Breast Cancer History/Substance Abuse: Positive breast [place] [facility] [place]. Osteoporosis History:    Family history does not include a first or second degree relative with osteopenia or osteoporosis. A bone density scan has never been obtained.      Past Medical History:   Diagnosis Date    Gallstones     Genital herpes     25 years ago    High cholesterol     Meningitis     Sleep apnea      Past Surgical History:   Procedure Laterality Date    HX DILATION AND CURETTAGE      age 22    HX ORTHOPAEDIC      right leg tissue removal    HX TUBAL LIGATION  2019       Current Outpatient Medications   Medication Sig Dispense Refill    ondansetron (Zofran ODT) 4 mg disintegrating tablet Take 1 Tab by mouth every six (6) hours as needed for Nausea or Vomiting. 12 Tab 0    PNV DX.70/BICONLF fum/folic ac (PRENATAL PO) Take  by mouth.  OTHER bcp      omeprazole (PRILOSEC) 20 mg capsule Take 20 mg by mouth as needed.  cpap machine kit by Does Not Apply route. Allergies: Aspirin, Pcn [penicillins], and Tylenol [acetaminophen]     Tobacco History:  reports that she quit smoking about 2 years ago. Her smoking use included cigarettes. She quit after 20.00 years of use. She has never used smokeless tobacco.  Alcohol Abuse:  reports no history of alcohol use. Drug Abuse:  reports no history of drug use.     Family Medical/Cancer History:   Family History   Problem Relation Age of Onset    Hypertension Mother     Diabetes Mother     Breast Cancer Maternal Grandmother     Hypertension Maternal Grandmother     Ovarian Cancer Maternal Grandmother     Kidney Disease Maternal Grandmother     Diabetes Maternal Uncle         Review of Systems - History obtained from the patient  Constitutional: negative for weight loss, fever, night sweats  HEENT: negative for hearing loss, earache, congestion, snoring, sorethroat  CV: negative for chest pain, palpitations, edema  Resp: negative for cough, shortness of breath, wheezing  GI: negative for change in bowel habits, abdominal pain, black or bloody stools  : negative for frequency, dysuria, hematuria, vaginal discharge  MSK: negative for back pain, joint pain, muscle pain  Breast: negative for breast lumps, nipple discharge, galactorrhea  Skin :negative for itching, rash, hives  Neuro: negative for dizziness, headache, confusion, weakness  Psych: negative for anxiety, depression, change in mood  Heme/lymph: negative for bleeding, bruising, pallor    Physical Exam    Visit Vitals  /62 (BP 1 Location: Right arm, BP Patient Position: Sitting)   Ht 5' 4\" (1.626 m)   Wt 197 lb 6.4 oz (89.5 kg)   LMP 01/01/2021 (Exact Date)   Breastfeeding No   BMI 33.88 kg/m²       Constitutional  · Appearance: well-nourished, well developed, alert, in no acute distress    HENT  · Head and Face: appears normal    Neck  · Inspection/Palpation: normal appearance, no masses or tenderness  · Lymph Nodes: no lymphadenopathy present  · Thyroid: gland size normal, nontender, no nodules or masses present on palpation    Chest  · Respiratory Effort: breathing unlabored  · Auscultation: normal breath sounds    Cardiovascular  · Heart:  · Auscultation: regular rate and rhythm without murmur    Breasts  · Inspection of Breasts: breasts symmetrical, no skin changes, no discharge present, nipple appearance normal, no skin retraction present  · Palpation of Breasts and Axillae: no masses present on palpation, no breast tenderness  · Axillary Lymph Nodes: no lymphadenopathy present    Gastrointestinal  · Abdominal Examination: abdomen non-tender to palpation, normal bowel sounds, no masses present  · Liver and spleen: no hepatomegaly present, spleen not palpable  · Hernias: no hernias identified    Genitourinary  · External Genitalia: normal appearance for age, no discharge present, no tenderness present, no inflammatory lesions present, no masses present, no atrophy present  · Vagina: normal vaginal vault without central or paravaginal defects, no discharge present, no inflammatory lesions present, no masses present  · Bladder: non-tender to palpation  · Urethra: appears normal  · Cervix: normal   · Uterus: normal size, shape and consistency  · Adnexa: no adnexal tenderness present, no adnexal masses present  · Perineum: perineum within normal limits, no evidence of trauma, no rashes or skin lesions present  · Anus: anus within normal limits, no hemorrhoids present  · Inguinal Lymph Nodes: no lymphadenopathy present    Skin  · General Inspection: no rash, no lesions identified    Neurologic/Psychiatric  · Mental Status:  · Orientation: grossly oriented to person, place and time  · Mood and Affect: mood normal, affect appropriate    Assessment:  Routine gynecologic examination  Her current medical status is satisfactory with no evidence of significant gynecologic issues. Urine culture sent.     Plan:  Counseled re: diet, exercise, healthy lifestyle  Return for yearly wellness visits  Rec annual mammogram

## 2021-01-20 LAB
CYTOLOGIST CVX/VAG CYTO: NORMAL
CYTOLOGY CVX/VAG DOC CYTO: NORMAL
CYTOLOGY CVX/VAG DOC THIN PREP: NORMAL
CYTOLOGY HISTORY:: NORMAL
DX ICD CODE: NORMAL
HPV I/H RISK 4 DNA CVX QL PROBE+SIG AMP: NEGATIVE
Lab: NORMAL
OTHER STN SPEC: NORMAL
STAT OF ADQ CVX/VAG CYTO-IMP: NORMAL

## 2021-07-08 ENCOUNTER — APPOINTMENT (OUTPATIENT)
Dept: CT IMAGING | Age: 41
End: 2021-07-08
Attending: STUDENT IN AN ORGANIZED HEALTH CARE EDUCATION/TRAINING PROGRAM
Payer: MEDICAID

## 2021-07-08 ENCOUNTER — HOSPITAL ENCOUNTER (EMERGENCY)
Age: 41
Discharge: HOME | End: 2021-07-08
Attending: EMERGENCY MEDICINE
Payer: MEDICAID

## 2021-07-08 ENCOUNTER — APPOINTMENT (OUTPATIENT)
Dept: GENERAL RADIOLOGY | Age: 41
End: 2021-07-08
Attending: EMERGENCY MEDICINE
Payer: MEDICAID

## 2021-07-08 VITALS
SYSTOLIC BLOOD PRESSURE: 131 MMHG | DIASTOLIC BLOOD PRESSURE: 89 MMHG | HEART RATE: 75 BPM | OXYGEN SATURATION: 100 % | RESPIRATION RATE: 16 BRPM | HEIGHT: 64 IN | BODY MASS INDEX: 33.63 KG/M2 | TEMPERATURE: 97.5 F | WEIGHT: 197 LBS

## 2021-07-08 DIAGNOSIS — K80.20 CALCULUS OF GALLBLADDER WITHOUT CHOLECYSTITIS WITHOUT OBSTRUCTION: Primary | ICD-10-CM

## 2021-07-08 LAB
ALBUMIN SERPL-MCNC: 4.2 G/DL (ref 3.5–5)
ALBUMIN/GLOB SERPL: 1.4 {RATIO} (ref 1.1–2.2)
ALP SERPL-CCNC: 45 U/L (ref 45–117)
ALT SERPL-CCNC: 16 U/L (ref 12–78)
ANION GAP SERPL CALC-SCNC: 3 MMOL/L (ref 5–15)
APPEARANCE UR: CLEAR
AST SERPL-CCNC: 8 U/L (ref 15–37)
BACTERIA URNS QL MICRO: NEGATIVE /HPF
BASOPHILS # BLD: 0.1 K/UL (ref 0–0.1)
BASOPHILS NFR BLD: 1 % (ref 0–1)
BILIRUB SERPL-MCNC: 0.4 MG/DL (ref 0.2–1)
BILIRUB UR QL: NEGATIVE
BUN SERPL-MCNC: 15 MG/DL (ref 6–20)
BUN/CREAT SERPL: 21 (ref 12–20)
CALCIUM SERPL-MCNC: 8.3 MG/DL (ref 8.5–10.1)
CHLORIDE SERPL-SCNC: 109 MMOL/L (ref 97–108)
CO2 SERPL-SCNC: 26 MMOL/L (ref 21–32)
COLOR UR: ABNORMAL
CREAT SERPL-MCNC: 0.71 MG/DL (ref 0.55–1.02)
DIFFERENTIAL METHOD BLD: ABNORMAL
EOSINOPHIL # BLD: 0.5 K/UL (ref 0–0.4)
EOSINOPHIL NFR BLD: 5 % (ref 0–7)
EPITH CASTS URNS QL MICRO: ABNORMAL /LPF
ERYTHROCYTE [DISTWIDTH] IN BLOOD BY AUTOMATED COUNT: 13.8 % (ref 11.5–14.5)
GLOBULIN SER CALC-MCNC: 2.9 G/DL (ref 2–4)
GLUCOSE SERPL-MCNC: 86 MG/DL (ref 65–100)
GLUCOSE UR STRIP.AUTO-MCNC: NEGATIVE MG/DL
HCG UR QL: NEGATIVE
HCT VFR BLD AUTO: 39.9 % (ref 35–47)
HGB BLD-MCNC: 12.8 G/DL (ref 11.5–16)
HGB UR QL STRIP: ABNORMAL
HYALINE CASTS URNS QL MICRO: ABNORMAL /LPF (ref 0–5)
IMM GRANULOCYTES # BLD AUTO: 0 K/UL (ref 0–0.04)
IMM GRANULOCYTES NFR BLD AUTO: 0 % (ref 0–0.5)
KETONES UR QL STRIP.AUTO: NEGATIVE MG/DL
LEUKOCYTE ESTERASE UR QL STRIP.AUTO: ABNORMAL
LIPASE SERPL-CCNC: 79 U/L (ref 73–393)
LYMPHOCYTES # BLD: 3.6 K/UL (ref 0.8–3.5)
LYMPHOCYTES NFR BLD: 38 % (ref 12–49)
MCH RBC QN AUTO: 30.3 PG (ref 26–34)
MCHC RBC AUTO-ENTMCNC: 32.1 G/DL (ref 30–36.5)
MCV RBC AUTO: 94.3 FL (ref 80–99)
MONOCYTES # BLD: 0.6 K/UL (ref 0–1)
MONOCYTES NFR BLD: 7 % (ref 5–13)
NEUTS SEG # BLD: 4.6 K/UL (ref 1.8–8)
NEUTS SEG NFR BLD: 49 % (ref 32–75)
NITRITE UR QL STRIP.AUTO: NEGATIVE
NRBC # BLD: 0 K/UL (ref 0–0.01)
NRBC BLD-RTO: 0 PER 100 WBC
PH UR STRIP: 6 [PH] (ref 5–8)
PLATELET # BLD AUTO: 310 K/UL (ref 150–400)
PMV BLD AUTO: 9.3 FL (ref 8.9–12.9)
POTASSIUM SERPL-SCNC: 4 MMOL/L (ref 3.5–5.1)
PROT SERPL-MCNC: 7.1 G/DL (ref 6.4–8.2)
PROT UR STRIP-MCNC: NEGATIVE MG/DL
RBC # BLD AUTO: 4.23 M/UL (ref 3.8–5.2)
RBC #/AREA URNS HPF: ABNORMAL /HPF (ref 0–5)
SODIUM SERPL-SCNC: 138 MMOL/L (ref 136–145)
SP GR UR REFRACTOMETRY: 1.01 (ref 1–1.03)
UR CULT HOLD, URHOLD: NORMAL
UROBILINOGEN UR QL STRIP.AUTO: 0.2 EU/DL (ref 0.2–1)
WBC # BLD AUTO: 9.4 K/UL (ref 3.6–11)
WBC URNS QL MICRO: ABNORMAL /HPF (ref 0–4)

## 2021-07-08 PROCEDURE — 71046 X-RAY EXAM CHEST 2 VIEWS: CPT

## 2021-07-08 PROCEDURE — 80053 COMPREHEN METABOLIC PANEL: CPT

## 2021-07-08 PROCEDURE — 81025 URINE PREGNANCY TEST: CPT

## 2021-07-08 PROCEDURE — 74177 CT ABD & PELVIS W/CONTRAST: CPT

## 2021-07-08 PROCEDURE — 99282 EMERGENCY DEPT VISIT SF MDM: CPT

## 2021-07-08 PROCEDURE — 36415 COLL VENOUS BLD VENIPUNCTURE: CPT

## 2021-07-08 PROCEDURE — 85025 COMPLETE CBC W/AUTO DIFF WBC: CPT

## 2021-07-08 PROCEDURE — 96375 TX/PRO/DX INJ NEW DRUG ADDON: CPT

## 2021-07-08 PROCEDURE — 74011250636 HC RX REV CODE- 250/636: Performed by: STUDENT IN AN ORGANIZED HEALTH CARE EDUCATION/TRAINING PROGRAM

## 2021-07-08 PROCEDURE — 83690 ASSAY OF LIPASE: CPT

## 2021-07-08 PROCEDURE — 81001 URINALYSIS AUTO W/SCOPE: CPT

## 2021-07-08 PROCEDURE — 74011000636 HC RX REV CODE- 636: Performed by: RADIOLOGY

## 2021-07-08 PROCEDURE — 96374 THER/PROPH/DIAG INJ IV PUSH: CPT

## 2021-07-08 PROCEDURE — 96361 HYDRATE IV INFUSION ADD-ON: CPT

## 2021-07-08 RX ORDER — KETOROLAC TROMETHAMINE 30 MG/ML
30 INJECTION, SOLUTION INTRAMUSCULAR; INTRAVENOUS
Status: COMPLETED | OUTPATIENT
Start: 2021-07-08 | End: 2021-07-08

## 2021-07-08 RX ORDER — ONDANSETRON 2 MG/ML
4 INJECTION INTRAMUSCULAR; INTRAVENOUS
Status: COMPLETED | OUTPATIENT
Start: 2021-07-08 | End: 2021-07-08

## 2021-07-08 RX ADMIN — IOPAMIDOL 100 ML: 755 INJECTION, SOLUTION INTRAVENOUS at 16:15

## 2021-07-08 RX ADMIN — SODIUM CHLORIDE 1000 ML: 9 INJECTION, SOLUTION INTRAVENOUS at 15:34

## 2021-07-08 RX ADMIN — ONDANSETRON 4 MG: 2 INJECTION INTRAMUSCULAR; INTRAVENOUS at 15:34

## 2021-07-08 RX ADMIN — KETOROLAC TROMETHAMINE 30 MG: 30 INJECTION, SOLUTION INTRAMUSCULAR; INTRAVENOUS at 15:34

## 2021-07-08 NOTE — ED TRIAGE NOTES
Patient reports RUQ abdominal pain for the last 1.5 weeks- Pt reports the pain intermittently radiates around her abdomen to her back-   Pt reports she feels as though the right side of her abdomen was swollen so she presents tot he ED for evaluation.     Pt reports she was previously constipated and took MOM and had a bowel movement yesterday-     Pt denies fevers or vomiting

## 2021-07-08 NOTE — ED NOTES
5:00 PM  Change of shift. Care of patient taken over from [facility] PA; H&P reviewed, bedside handoff complete. Abdominal CT scan resulted which shows cholelithiasis without evidence of cholecystitis or biliary obstruction. Lab work unremarkable. Pain mild. Patient informed of findings and is discharged home with outpatient follow-up with general surgery to discuss elective cholecystectomy as desired. Strict return precautions outlined. All questions answered at this time. 5:09 PM  Pt has been reevaluated. There are no new complaints, changes, or physical findings at this time. Medications have been reviewed w/ pt and/or family. Pt and/or family's questions have been answered. Pt and/or family expressed good understanding of the dx/tx/rx and is in agreement with plan of care. Pt instructed and agreed to f/u w/ general surgery  and to return to ED upon further deterioration. Pt is ready for discharge. IMPRESSION:  1. Calculus of gallbladder without cholecystitis without obstruction        PLAN:  1. Current Discharge Medication List        2.    Follow-up Information     Follow up With Specialties Details Why Marian Gold MD General Surgery Schedule an appointment as soon as possible for a visit   [address] [facility]  [phone]              Return to ED if worse

## 2021-07-08 NOTE — ED PROVIDER NOTES
Patient is a 79-year-old female with a past medical history of gallstones, hyperlipidemia, meningitis who presents to ED complaining of right upper quadrant abdominal pain that radiates to her back. Patient reports it has been ongoing for the past week and a half but has greatly worsened today. Patient reports currently pain is a 5 out of 10 and when most severe is a 7 out of 10. States it is exacerbated by eating food and relieved by not eating. She reports occasional nausea. She denies any vomiting or diarrhea. She also denies any fever, chills, chest pain, difficulty breathing, cough, palpitations, leg swelling, flank pain, dysuria, urinary frequency and syncope. She states she was told she had gallstones in 2017 and has not seen a surgeon since. Social history: Admits to tobacco use 0.5 packs/day. Denies alcohol use or illicit drug use.            Past Medical History:   Diagnosis Date    Gallstones     Genital herpes     25 years ago    High cholesterol     Meningitis     Sleep apnea        Past Surgical History:   Procedure Laterality Date    HX DILATION AND CURETTAGE      age 19    HX ORTHOPAEDIC      right leg tissue removal    HX TUBAL LIGATION  2019         Family History:   Problem Relation Age of Onset    Hypertension Mother     Diabetes Mother     Breast Cancer Maternal Grandmother     Hypertension Maternal Grandmother     Ovarian Cancer Maternal Grandmother     Kidney Disease Maternal Grandmother     Diabetes Maternal Uncle        Social History     Socioeconomic History    Marital status: SINGLE     Spouse name: Not on file    Number of children: Not on file    Years of education: Not on file    Highest education level: Not on file   Occupational History    Occupation: Customer Service   Tobacco Use    Smoking status: Former Smoker     Years: 20.00     Types: Cigarettes     Quit date: 10/4/2018     Years since quittin.7    Smokeless tobacco: Never Used   Substance and Sexual Activity    Alcohol use: No    Drug use: No    Sexual activity: Yes     Partners: Male     Birth control/protection: Surgical     Comment: BTL   Other Topics Concern     Service No    Blood Transfusions No    Caffeine Concern No    Occupational Exposure No    Hobby Hazards No    Sleep Concern No    Stress Concern No    Weight Concern No    Special Diet No    Back Care No    Exercise Yes    Bike Helmet Yes    Seat Belt Yes    Self-Exams Yes   Social History Narrative    Not on file     Social Determinants of Health     Financial Resource Strain:     Difficulty of Paying Living Expenses:    Food Insecurity:     Worried About Running Out of Food in the Last Year:     Ran Out of Food in the Last Year:    Transportation Needs:     Lack of Transportation (Medical):  Lack of Transportation (Non-Medical):    Physical Activity:     Days of Exercise per Week:     Minutes of Exercise per Session:    Stress:     Feeling of Stress :    Social Connections:     Frequency of Communication with Friends and Family:     Frequency of Social Gatherings with Friends and Family:     Attends Islam Services:     Active Member of Clubs or Organizations:     Attends Club or Organization Meetings:     Marital Status:    Intimate Partner Violence:     Fear of Current or Ex-Partner:     Emotionally Abused:     Physically Abused:     Sexually Abused: ALLERGIES: Aspirin, Pcn [penicillins], and Tylenol [acetaminophen]    Review of Systems   Constitutional: Positive for appetite change. Negative for activity change, chills and fever. HENT: Negative for congestion and sore throat. Eyes: Negative for pain and discharge. Respiratory: Negative for cough and shortness of breath. Cardiovascular: Negative for chest pain. Gastrointestinal: Positive for abdominal pain and nausea. Negative for diarrhea and vomiting. Genitourinary: Negative for dysuria and urgency. Musculoskeletal: Negative for back pain and neck pain. Skin: Negative for rash. Allergic/Immunologic: Negative for immunocompromised state. Neurological: Negative for syncope, weakness and headaches. Psychiatric/Behavioral: Negative for confusion. All other systems reviewed and are negative. Vitals:    07/08/21 1348   BP: 131/89   Pulse: 75   Resp: 16   Temp: 97.5 °F (36.4 °C)   SpO2: 100%   Weight: 89.4 kg (197 lb)   Height: 5' 4\" (1.626 m)            Physical Exam  Vitals and nursing note reviewed. Constitutional:       General: She is not in acute distress. Appearance: Normal appearance. She is well-developed. She is not toxic-appearing. HENT:      Head: Normocephalic and atraumatic. Nose: Nose normal.      Mouth/Throat:      Mouth: Mucous membranes are moist.   Eyes:      General: Lids are normal.      Extraocular Movements: Extraocular movements intact. Conjunctiva/sclera: Conjunctivae normal.   Cardiovascular:      Rate and Rhythm: Normal rate and regular rhythm. Pulses: Normal pulses. Heart sounds: Normal heart sounds, S1 normal and S2 normal.   Pulmonary:      Effort: Pulmonary effort is normal. No accessory muscle usage. Breath sounds: Normal breath sounds. Abdominal:      Palpations: Abdomen is soft. Tenderness: There is abdominal tenderness in the right upper quadrant. There is no right CVA tenderness or left CVA tenderness. Positive signs include Millan's sign. Musculoskeletal:         General: Normal range of motion. Cervical back: Normal range of motion and neck supple. Skin:     General: Skin is warm and dry. Capillary Refill: Capillary refill takes less than 2 seconds. Neurological:      General: No focal deficit present. Mental Status: She is alert and oriented to person, place, and time. Mental status is at baseline.    Psychiatric:         Attention and Perception: Attention normal.         Mood and Affect: Mood and affect normal.         Speech: Speech normal.         Behavior: Behavior is cooperative. Thought Content: Thought content normal.         Cognition and Memory: Cognition normal.         Judgment: Judgment normal.          MDM  Number of Diagnoses or Management Options  Calculus of gallbladder without cholecystitis without obstruction  Diagnosis management comments: Patient with history of gallstones who presents with right upper quadrant pain exacerbated by eating. Associated nausea. Vital signs are stable. Right upper quadrant pain noted on exam.  Differential diagnosis includes biliary colic, cholecystitis, cholelithiasis, pancreatitis, enteritis, gastritis, GERD, etc.  Will order CBC, CMP, lipase, UA and CT abdomen pelvis. Patient given Toradol, zofran and IV fluids. Labs unremarkable. CT pending. 6:15 PM  Change of shift. Care of patient signed over to Roxane John. Bedside handoff complete. Awaiting CT abdomen/pelvis. If negative d/c with general surgery follow-up.           Amount and/or Complexity of Data Reviewed  Clinical lab tests: reviewed  Tests in the radiology section of CPT®: reviewed  Discuss the patient with other providers: yes (Dr. Phillip Jay, ED Attending )           Procedures

## 2022-03-17 ENCOUNTER — OFFICE VISIT (OUTPATIENT)
Dept: OBGYN CLINIC | Age: 42
End: 2022-03-17

## 2022-03-17 DIAGNOSIS — N83.202 CYST OF LEFT OVARY: Primary | ICD-10-CM

## 2022-03-17 PROCEDURE — 99213 OFFICE O/P EST LOW 20 MIN: CPT | Performed by: OBSTETRICS & GYNECOLOGY

## 2022-03-17 NOTE — PATIENT INSTRUCTIONS
Functional Ovarian Cyst: Care Instructions  Overview     A functional ovarian cyst is a fluid-filled sac that forms on the ovary. A sac normally forms during ovulation to hold a maturing egg. Usually the sac goes away after the egg is released. But if the egg is not released, or if the sac closes up after the egg is released, the sac can swell up with fluid. This forms a functional cyst.  These ovarian cysts are different than ovarian growths caused by other problems, such as cancer. Most functional ovarian cysts cause no symptoms and go away on their own. Some cause mild pain. Others can cause severe pain when they break or bleed. Follow-up care is a key part of your treatment and safety. Be sure to make and go to all appointments, and call your doctor if you are having problems. It's also a good idea to know your test results and keep a list of the medicines you take. How can you care for yourself at home? · Use heat, such as a hot water bottle, a heating pad set on low, or a warm bath, to relax tense muscles and relieve cramping. · Be safe with medicines. Take pain medicines exactly as directed. ? If the doctor gave you a prescription medicine for pain, take it as prescribed. ? If you are not taking a prescription pain medicine, ask your doctor if you can take an over-the-counter medicine. · Avoid constipation. Make sure you drink enough fluids and include fruits, vegetables, and fiber in your diet each day. Constipation does not cause ovarian cysts, but it may make you feel more uncomfortable. When should you call for help? Call your doctor now or seek immediate medical care if:    · You have severe vaginal bleeding.     · You have new or worse belly or pelvic pain. Watch closely for changes in your health, and be sure to contact your doctor if:    · You have unusual vaginal bleeding.     · You do not get better as expected. Where can you learn more?   Go to http://www.gray.com/  Enter D655 in the search box to learn more about \"Functional Ovarian Cyst: Care Instructions. \"  Current as of: November 22, 2021               Content Version: 13.2  © 8655-4961 [employer]. Care instructions adapted under license by [employer] (which disclaims liability or warranty for this information). If you have questions about a medical condition or this instruction, always ask your healthcare professional. [place] [address] any warranty or liability for your use of this information.

## 2022-03-17 NOTE — PROGRESS NOTES
Ovarian cyst evaluation    Chief Complaint   Ovarian Cyst   Patient's last menstrual period was 03/16/2022 (exact date). HPI    Adnexal Mass History:  42 y.o. female presents with an ovarian cyst first noticed by the ER at [facility] approximately 2 weeks ago. Felt pressure and thought she might have a UTI. They told her that it was a cyst.  Since then she feels pretty much back to normal.      Current Method of Contraception is: BTL    Imaging History:  She had an ultrasound today that showed:  UTERUS IS ANTEVERTED, NORMAL IN SIZE AND ECHOGENICITY. ENDOMETRIUM MEASURES 8.74 MM IN THICKNESS. NO EVIDENCE OF MASSES OR ABNORMALITIES ARE SEEN. RIGHT OVARY APPEARS WITHIN NORMAL LIMITS. LEFT OVARY APPEARS TO HAVE A COMPLEX CYSTIC STRUCTURE MEASURING 37 X 25 X 28 MM. NO FREE FLUID SEEN IN THE CDS.     Additional/Aggravating/Alleviating factors:  She admits to the following additional symptoms: pelvic pressure   She denies severe abdominal pain and peritoneal symptoms  The patient denies aggravating factors  The patient has alleviating factors    Past History  She has no past medical history that is notable  She  denies she had this condition in the past    Past Medical History:   Diagnosis Date    Gallstones     Genital herpes     25 years ago    High cholesterol     Meningitis     Sleep apnea      Past Surgical History:   Procedure Laterality Date    HX DILATION AND CURETTAGE      age 22   [de-identified] ORTHOPAEDIC      right leg tissue removal    HX TUBAL LIGATION  2019     Social History     Occupational History    Occupation: Customer Service   Tobacco Use    Smoking status: Former Smoker     Years: 20.00     Types: Cigarettes     Quit date: 10/4/2018     Years since quitting: 3.4    Smokeless tobacco: Never Used   Substance and Sexual Activity    Alcohol use: No    Drug use: No    Sexual activity: Yes     Partners: Male     Birth control/protection: Surgical     Comment: BTL     Family History   Problem Relation Age of Onset    Hypertension Mother     Diabetes Mother     Breast Cancer Maternal Grandmother     Hypertension Maternal Grandmother     Ovarian Cancer Maternal Grandmother     Kidney Disease Maternal Grandmother     Diabetes Maternal Uncle        Allergies   Allergen Reactions    Aspirin Nausea and Vomiting    Pcn [Penicillins] Other (comments)    Tylenol [Acetaminophen] Nausea Only and Vertigo     Prior to Admission medications    Medication Sig Start Date End Date Taking? Authorizing Provider   ondansetron (Zofran ODT) 4 mg disintegrating tablet Take 1 Tab by mouth every six (6) hours as needed for Nausea or Vomiting. 9/29/20   Deborah Rm MD   PNV NY.59/NHBSELO fum/folic ac (PRENATAL PO) Take  by mouth. Provider, Historical   OTHER bcp    Zuri Hale MD   omeprazole (PRILOSEC) 20 mg capsule Take 20 mg by mouth as needed. Zuri Hale MD   cpap machine kit by Does Not Apply route. Zuri Hale MD        Review of Systems - History obtained from the patient  Negative for:   GI: nausea, vomiting, diarrhea, blood in stools  : see HPI  Skin: negative for rash, hives  Endocrine: negative for polyuria, polydypsia  Heme/lymph: negative for bleeding, bruising, lymph node enlargement or tenderness    Objective:    Visit Vitals  LMP 03/16/2022 (Exact Date)   Breastfeeding No       Physical Exam:   PHYSICAL EXAMINATION    Constitutional  · Appearance: well-nourished, well developed, alert, in no acute distress    HENT  · Head and Face: appears normal    Skin  · General Inspection: no rash, no lesions identified    Neurologic/Psychiatric  · Mental Status:  · Orientation: grossly oriented to person, place and time  · Mood and Affect: mood normal, affect appropriate    Assessment:   Small left ovarian cyst/mass. This may or may not have been the source of her discomfort as the symptoms have resolved. Plan:   Advised no treatment is necessary. RTO prn worsening of symptoms.   Discussed OCP as treatment for this prn.

## 2022-03-19 PROBLEM — Z34.83 ENCOUNTER FOR SUPERVISION OF OTHER NORMAL PREGNANCY, THIRD TRIMESTER: Status: ACTIVE | Noted: 2018-05-30

## 2022-03-19 PROBLEM — Z34.90 PREGNANCY: Status: ACTIVE | Noted: 2018-12-04

## 2022-03-19 PROBLEM — E66.01 SEVERE OBESITY (HCC): Status: ACTIVE | Noted: 2019-01-16

## 2022-04-04 NOTE — PROGRESS NOTES
Annual exam ages 20-44    Jose Bean is a ,  42 y.o. female   Patient's last menstrual period was 2022 (exact date). She presents for her annual checkup. She is having no significant problems. With regard to the Gardasil vaccine, she has not received it yet. Menstrual status:    Her periods are normal in flow. She is using three to ten pads or tampons per day, usually regular with a 26-32 day interval with 3-7 day duration. She does not have dysmenorrhea. She reports no premenstrual symptoms. Contraception:    The current method of family planning is tubal ligation. Sexual history:    She  reports being sexually active and has had partner(s) who are male. She reports using the following method of birth control/protection: Surgical.    Medical conditions:    Since her last annual GYN exam about one year ago, she has not the following changes in her health history: none. Surgical history confirmed with patient. has a past surgical history that includes hx orthopaedic; hx dilation and curettage; and hx tubal ligation (). Pap and Mammogram History:    Her most recent Pap smear was normal, obtained 1 year(s) ago. The patient had her mammogram today in our office. Breast Cancer History/Substance Abuse: negative    Past Medical History:   Diagnosis Date    Gallstones     Genital herpes     25 years ago    High cholesterol     Meningitis     Sleep apnea      Past Surgical History:   Procedure Laterality Date    HX DILATION AND CURETTAGE      age 22    HX ORTHOPAEDIC      right leg tissue removal    HX TUBAL LIGATION         Current Outpatient Medications   Medication Sig Dispense Refill    ondansetron (Zofran ODT) 4 mg disintegrating tablet Take 1 Tab by mouth every six (6) hours as needed for Nausea or Vomiting. 12 Tab 0    PNV XW.02/YAVIAUI fum/folic ac (PRENATAL PO) Take  by mouth.       OTHER bcp      omeprazole (PRILOSEC) 20 mg capsule Take 20 mg by mouth as needed.  cpap machine kit by Does Not Apply route. Allergies: Aspirin, Pcn [penicillins], and Tylenol [acetaminophen]     Tobacco History:  reports that she quit smoking about 3 years ago. Her smoking use included cigarettes. She quit after 20.00 years of use. She has never used smokeless tobacco.  Alcohol Abuse:  reports no history of alcohol use. Drug Abuse:  reports no history of drug use.     Family Medical/Cancer History:   Family History   Problem Relation Age of Onset    Hypertension Mother     Diabetes Mother     Breast Cancer Maternal Grandmother     Hypertension Maternal Grandmother     Ovarian Cancer Maternal Grandmother     Kidney Disease Maternal Grandmother     Diabetes Maternal Uncle         Review of Systems - History obtained from the patient  Constitutional: negative for weight loss, fever, night sweats  HEENT: negative for hearing loss, earache, congestion, snoring, sorethroat  CV: negative for chest pain, palpitations, edema  Resp: negative for cough, shortness of breath, wheezing  GI: negative for change in bowel habits, abdominal pain, black or bloody stools  : negative for frequency, dysuria, hematuria, vaginal discharge  MSK: negative for back pain, joint pain, muscle pain  Breast: negative for breast lumps, nipple discharge, galactorrhea  Skin :negative for itching, rash, hives  Neuro: negative for dizziness, headache, confusion, weakness  Psych: negative for anxiety, depression, change in mood  Heme/lymph: negative for bleeding, bruising, pallor    Physical Exam    Visit Vitals  LMP 03/16/2022 (Exact Date)       Constitutional  · Appearance: well-nourished, well developed, alert, in no acute distress    HENT  · Head and Face: appears normal    Neck  · Inspection/Palpation: normal appearance, no masses or tenderness  · Lymph Nodes: no lymphadenopathy present  · Thyroid: gland size normal, nontender, no nodules or masses present on palpation    Chest  · Respiratory Effort: breathing unlabored  · Auscultation: normal breath sounds    Cardiovascular  · Heart:  · Auscultation: regular rate and rhythm without murmur    Breasts  · Inspection of Breasts: breasts symmetrical, no skin changes, no discharge present, nipple appearance normal, no skin retraction present  · Palpation of Breasts and Axillae: no masses present on palpation, no breast tenderness  · Axillary Lymph Nodes: no lymphadenopathy present    Gastrointestinal  · Abdominal Examination: abdomen non-tender to palpation, normal bowel sounds, no masses present  · Liver and spleen: no hepatomegaly present, spleen not palpable  · Hernias: no hernias identified    Genitourinary  · External Genitalia: normal appearance for age, no discharge present, no tenderness present, no inflammatory lesions present, no masses present, no atrophy present  · Vagina: normal vaginal vault without central or paravaginal defects, no discharge present, no inflammatory lesions present, no masses present  · Bladder: non-tender to palpation  · Urethra: appears normal  · Cervix: normal   · Uterus: normal size, shape and consistency  · Adnexa: no adnexal tenderness present, no adnexal masses present  · Perineum: perineum within normal limits, no evidence of trauma, no rashes or skin lesions present  · Anus: anus within normal limits, no hemorrhoids present  · Inguinal Lymph Nodes: no lymphadenopathy present    Skin  · General Inspection: no rash, no lesions identified    Neurologic/Psychiatric  · Mental Status:  · Orientation: grossly oriented to person, place and time  · Mood and Affect: mood normal, affect appropriate    . Assessment:  Routine gynecologic examination  Her current medical status is satisfactory with no evidence of significant gynecologic issues.     Plan:  Counseled re: diet, exercise, healthy lifestyle  Return for yearly wellness visits  Gardisil counseling provided  Pt counseled regarding co-testing for high risk HPV with pap  Rec screening mammo at either 34 or 39

## 2022-04-06 ENCOUNTER — OFFICE VISIT (OUTPATIENT)
Dept: OBGYN CLINIC | Age: 42
End: 2022-04-06
Payer: MEDICAID

## 2022-04-06 DIAGNOSIS — Z01.419 ENCOUNTER FOR GYNECOLOGICAL EXAMINATION WITHOUT ABNORMAL FINDING: Primary | ICD-10-CM

## 2022-04-06 PROCEDURE — 99396 PREV VISIT EST AGE 40-64: CPT | Performed by: OBSTETRICS & GYNECOLOGY

## 2022-06-06 ENCOUNTER — TRANSCRIBE ORDER (OUTPATIENT)
Dept: SCHEDULING | Age: 42
End: 2022-06-06

## 2022-06-06 DIAGNOSIS — Z12.31 VISIT FOR SCREENING MAMMOGRAM: Primary | ICD-10-CM

## 2022-06-07 ENCOUNTER — HOSPITAL ENCOUNTER (OUTPATIENT)
Dept: MAMMOGRAPHY | Age: 42
Discharge: HOME | End: 2022-06-07
Attending: OBSTETRICS & GYNECOLOGY
Payer: MEDICAID

## 2022-06-07 DIAGNOSIS — Z12.31 VISIT FOR SCREENING MAMMOGRAM: ICD-10-CM

## 2022-06-07 PROCEDURE — 77067 SCR MAMMO BI INCL CAD: CPT

## 2022-11-03 ENCOUNTER — OFFICE VISIT (OUTPATIENT)
Dept: OBGYN CLINIC | Age: 42
End: 2022-11-03
Payer: MEDICAID

## 2022-11-03 VITALS — BODY MASS INDEX: 39 KG/M2 | DIASTOLIC BLOOD PRESSURE: 72 MMHG | SYSTOLIC BLOOD PRESSURE: 118 MMHG | WEIGHT: 227.2 LBS

## 2022-11-03 DIAGNOSIS — L72.3 SEBACEOUS CYST: Primary | ICD-10-CM

## 2022-11-03 PROCEDURE — 99213 OFFICE O/P EST LOW 20 MIN: CPT | Performed by: OBSTETRICS & GYNECOLOGY

## 2022-11-03 NOTE — PROGRESS NOTES
Vulvar lesion evaluation    Daniel Cross is a 36 y.o. female  Patient's last menstrual period was 10/20/2022. who presents with lump on her right labia  She noticed it 2 days ago. The lump has not shown the following change:. She reports the following associated symptoms: soreness. She denies the following associated symptoms: itching, redness, drainage. Aggravating factors: none. Alleviating factors: none. Past Medical History:   Diagnosis Date    Gallstones     Genital herpes     25 years ago    High cholesterol     Meningitis     Sleep apnea      Past Surgical History:   Procedure Laterality Date    HX DILATION AND CURETTAGE      age 22    HX ORTHOPAEDIC      right leg tissue removal    HX TUBAL LIGATION  2019     Social History     Occupational History    Occupation: Customer Service   Tobacco Use    Smoking status: Former     Years: 20.00     Types: Cigarettes     Quit date: 10/4/2018     Years since quittin.0    Smokeless tobacco: Never   Substance and Sexual Activity    Alcohol use: No    Drug use: No    Sexual activity: Yes     Partners: Male     Birth control/protection: Surgical     Comment: BTL     Family History   Problem Relation Age of Onset    Hypertension Mother     Diabetes Mother     Breast Cancer Maternal Grandmother     Hypertension Maternal Grandmother     Ovarian Cancer Maternal Grandmother     Kidney Disease Maternal Grandmother     Diabetes Maternal Uncle        Allergies   Allergen Reactions    Aspirin Nausea and Vomiting    Pcn [Penicillins] Other (comments)    Tylenol [Acetaminophen] Nausea Only and Vertigo     Prior to Admission medications    Medication Sig Start Date End Date Taking? Authorizing Provider   ondansetron (Zofran ODT) 4 mg disintegrating tablet Take 1 Tab by mouth every six (6) hours as needed for Nausea or Vomiting.   Patient not taking: Reported on 2022   El Rivera MD   PNV KZ.33/GJIHPUV fum/folic ac (PRENATAL PO) Take  by mouth. Patient not taking: Reported on 4/6/2022    Provider, Historical   OTHER bcp  Patient not taking: Reported on 4/6/2022    Zuri Hale MD   omeprazole (PRILOSEC) 20 mg capsule Take 20 mg by mouth as needed. Patient not taking: Reported on 4/6/2022    Zuri Hale MD   cpap machine kit by Does Not Apply route. Patient not taking: Reported on 4/6/2022    Zuri Hale MD        Review of Systems: History obtained from the patient  Constitutional: negative for weight loss, fever, night sweats  GI: negative for change in bowel habits, abdominal pain, black or bloody stools  : negative for frequency, dysuria, hematuria, vaginal discharge  MSK: negative for back pain, joint pain, muscle pain  Skin: negative for itching, rash, hives elsewhere  Neuro: negative for dizziness, headache, confusion, weakness  Psych: negative for anxiety, depression, change in mood  Heme/lymph: negative for bleeding, bruising, pallor    Objective:  Visit Vitals  /72   Wt 227 lb 3.2 oz (103.1 kg)   LMP 10/20/2022   BMI 39.00 kg/m²       Physical Exam:   PHYSICAL EXAMINATION    Constitutional  Appearance: well-nourished, well developed, alert, in no acute distress    Genitourinary  External Genitalia: single 1 cm sebaceous cyst right labia minora  Perineum: perineum within normal limits, no evidence of trauma, no rashes or skin lesions present  Anus: anus within normal limits, no hemorrhoids present  Inguinal Lymph Nodes: no lymphadenopathy present    Skin  General Inspection: no rash, no lesions identified    Neurologic/Psychiatric  Mental Status:  Orientation: grossly oriented to person, place and time  Mood and Affect: mood normal, affect appropriate          Assessment:   Sebaceous cyst right labia minora    Plan:   Sitz bath, moist heat and massage      RTO prn if symptoms persist or worsen. Instructions given to pt. Handouts given to pt.

## 2023-05-03 ENCOUNTER — OFFICE VISIT (OUTPATIENT)
Dept: OBGYN CLINIC | Age: 43
End: 2023-05-03
Payer: MEDICAID

## 2023-05-03 VITALS
HEIGHT: 64 IN | BODY MASS INDEX: 39.09 KG/M2 | DIASTOLIC BLOOD PRESSURE: 76 MMHG | HEART RATE: 76 BPM | WEIGHT: 229 LBS | SYSTOLIC BLOOD PRESSURE: 138 MMHG

## 2023-05-03 DIAGNOSIS — Z01.419 ENCOUNTER FOR GYNECOLOGICAL EXAMINATION WITHOUT ABNORMAL FINDING: Primary | ICD-10-CM

## 2023-05-03 PROCEDURE — 99396 PREV VISIT EST AGE 40-64: CPT | Performed by: OBSTETRICS & GYNECOLOGY

## 2023-05-03 RX ORDER — CHOLECALCIFEROL (VITAMIN D3) 125 MCG
CAPSULE ORAL
COMMUNITY

## 2023-05-03 RX ORDER — UREA 10 %
LOTION (ML) TOPICAL
COMMUNITY

## 2023-05-03 RX ORDER — MULTIVITAMIN
1 TABLET ORAL DAILY
COMMUNITY

## 2023-05-03 RX ORDER — CHOLECALCIFEROL (VITAMIN D3) 50 MCG
CAPSULE ORAL
COMMUNITY

## 2023-05-03 RX ORDER — PETROLATUM,WHITE/LANOLIN
OINTMENT (GRAM) TOPICAL 3 TIMES DAILY
COMMUNITY